# Patient Record
Sex: MALE | Race: BLACK OR AFRICAN AMERICAN | NOT HISPANIC OR LATINO | Employment: FULL TIME | URBAN - METROPOLITAN AREA
[De-identification: names, ages, dates, MRNs, and addresses within clinical notes are randomized per-mention and may not be internally consistent; named-entity substitution may affect disease eponyms.]

---

## 2017-02-09 ENCOUNTER — OFFICE VISIT - HEALTHEAST (OUTPATIENT)
Dept: FAMILY MEDICINE | Facility: CLINIC | Age: 37
End: 2017-02-09

## 2017-02-09 DIAGNOSIS — I10 ACCELERATED HYPERTENSION: ICD-10-CM

## 2017-02-14 ENCOUNTER — RECORDS - HEALTHEAST (OUTPATIENT)
Dept: ADMINISTRATIVE | Facility: OTHER | Age: 37
End: 2017-02-14

## 2017-02-14 ENCOUNTER — OFFICE VISIT - HEALTHEAST (OUTPATIENT)
Dept: FAMILY MEDICINE | Facility: CLINIC | Age: 37
End: 2017-02-14

## 2017-02-14 DIAGNOSIS — N17.9 ACUTE RENAL FAILURE, UNSPECIFIED ACUTE RENAL FAILURE TYPE (H): ICD-10-CM

## 2017-02-14 DIAGNOSIS — L73.9 CHRONIC FOLLICULITIS: ICD-10-CM

## 2017-02-14 DIAGNOSIS — I10 HYPERTENSION: ICD-10-CM

## 2017-02-14 DIAGNOSIS — H35.032 HYPERTENSIVE RETINOPATHY OF LEFT EYE: ICD-10-CM

## 2017-02-14 LAB — HBA1C MFR BLD: 5 % (ref 3.5–6)

## 2017-02-14 ASSESSMENT — MIFFLIN-ST. JEOR: SCORE: 2178.13

## 2017-02-17 ENCOUNTER — AMBULATORY - HEALTHEAST (OUTPATIENT)
Dept: NURSING | Facility: CLINIC | Age: 37
End: 2017-02-17

## 2017-02-17 DIAGNOSIS — I16.1 HYPERTENSIVE EMERGENCY: ICD-10-CM

## 2017-03-13 ENCOUNTER — RECORDS - HEALTHEAST (OUTPATIENT)
Dept: ADMINISTRATIVE | Facility: OTHER | Age: 37
End: 2017-03-13

## 2017-03-14 ENCOUNTER — OFFICE VISIT - HEALTHEAST (OUTPATIENT)
Dept: FAMILY MEDICINE | Facility: CLINIC | Age: 37
End: 2017-03-14

## 2017-03-14 DIAGNOSIS — N17.9 ACUTE RENAL FAILURE, UNSPECIFIED ACUTE RENAL FAILURE TYPE (H): ICD-10-CM

## 2017-03-14 DIAGNOSIS — R06.81 APNEA: ICD-10-CM

## 2017-03-14 DIAGNOSIS — I10 HYPERTENSION: ICD-10-CM

## 2017-04-03 ENCOUNTER — COMMUNICATION - HEALTHEAST (OUTPATIENT)
Dept: FAMILY MEDICINE | Facility: CLINIC | Age: 37
End: 2017-04-03

## 2017-04-04 ENCOUNTER — COMMUNICATION - HEALTHEAST (OUTPATIENT)
Dept: INTENSIVE CARE | Facility: CLINIC | Age: 37
End: 2017-04-04

## 2017-04-04 DIAGNOSIS — I16.1 HYPERTENSIVE EMERGENCY: ICD-10-CM

## 2017-04-10 ENCOUNTER — COMMUNICATION - HEALTHEAST (OUTPATIENT)
Dept: FAMILY MEDICINE | Facility: CLINIC | Age: 37
End: 2017-04-10

## 2017-04-10 DIAGNOSIS — I16.1 HYPERTENSIVE EMERGENCY: ICD-10-CM

## 2017-06-12 ENCOUNTER — COMMUNICATION - HEALTHEAST (OUTPATIENT)
Dept: INTENSIVE CARE | Facility: CLINIC | Age: 37
End: 2017-06-12

## 2017-06-12 DIAGNOSIS — I16.1 HYPERTENSIVE EMERGENCY: ICD-10-CM

## 2017-07-20 ENCOUNTER — COMMUNICATION - HEALTHEAST (OUTPATIENT)
Dept: FAMILY MEDICINE | Facility: CLINIC | Age: 37
End: 2017-07-20

## 2017-07-26 ENCOUNTER — COMMUNICATION - HEALTHEAST (OUTPATIENT)
Dept: FAMILY MEDICINE | Facility: CLINIC | Age: 37
End: 2017-07-26

## 2017-09-11 ENCOUNTER — COMMUNICATION - HEALTHEAST (OUTPATIENT)
Dept: FAMILY MEDICINE | Facility: CLINIC | Age: 37
End: 2017-09-11

## 2017-10-18 ENCOUNTER — COMMUNICATION - HEALTHEAST (OUTPATIENT)
Dept: FAMILY MEDICINE | Facility: CLINIC | Age: 37
End: 2017-10-18

## 2017-11-15 ENCOUNTER — COMMUNICATION - HEALTHEAST (OUTPATIENT)
Dept: FAMILY MEDICINE | Facility: CLINIC | Age: 37
End: 2017-11-15

## 2017-11-17 ENCOUNTER — OFFICE VISIT - HEALTHEAST (OUTPATIENT)
Dept: FAMILY MEDICINE | Facility: CLINIC | Age: 37
End: 2017-11-17

## 2017-11-17 DIAGNOSIS — I10 HYPERTENSION: ICD-10-CM

## 2017-11-17 DIAGNOSIS — N18.9 CHRONIC RENAL DISEASE: ICD-10-CM

## 2017-11-17 DIAGNOSIS — I16.1 HYPERTENSIVE EMERGENCY: ICD-10-CM

## 2017-11-17 DIAGNOSIS — Z00.00 ROUTINE GENERAL MEDICAL EXAMINATION AT A HEALTH CARE FACILITY: ICD-10-CM

## 2017-11-17 DIAGNOSIS — K04.7 DENTAL ABSCESS: ICD-10-CM

## 2017-11-17 LAB
CHOLEST SERPL-MCNC: 189 MG/DL
FASTING STATUS PATIENT QL REPORTED: YES
HDLC SERPL-MCNC: 28 MG/DL
LDLC SERPL CALC-MCNC: 133 MG/DL
TRIGL SERPL-MCNC: 139 MG/DL

## 2017-11-17 ASSESSMENT — MIFFLIN-ST. JEOR: SCORE: 2187.2

## 2017-11-22 ENCOUNTER — COMMUNICATION - HEALTHEAST (OUTPATIENT)
Dept: FAMILY MEDICINE | Facility: CLINIC | Age: 37
End: 2017-11-22

## 2018-01-05 ENCOUNTER — COMMUNICATION - HEALTHEAST (OUTPATIENT)
Dept: ADMINISTRATIVE | Facility: CLINIC | Age: 38
End: 2018-01-05

## 2018-01-06 ENCOUNTER — COMMUNICATION - HEALTHEAST (OUTPATIENT)
Dept: FAMILY MEDICINE | Facility: CLINIC | Age: 38
End: 2018-01-06

## 2018-01-16 ENCOUNTER — COMMUNICATION - HEALTHEAST (OUTPATIENT)
Dept: FAMILY MEDICINE | Facility: CLINIC | Age: 38
End: 2018-01-16

## 2018-02-23 ENCOUNTER — COMMUNICATION - HEALTHEAST (OUTPATIENT)
Dept: FAMILY MEDICINE | Facility: CLINIC | Age: 38
End: 2018-02-23

## 2018-03-07 ENCOUNTER — COMMUNICATION - HEALTHEAST (OUTPATIENT)
Dept: FAMILY MEDICINE | Facility: CLINIC | Age: 38
End: 2018-03-07

## 2018-04-13 ENCOUNTER — COMMUNICATION - HEALTHEAST (OUTPATIENT)
Dept: FAMILY MEDICINE | Facility: CLINIC | Age: 38
End: 2018-04-13

## 2018-04-13 DIAGNOSIS — I16.1 HYPERTENSIVE EMERGENCY: ICD-10-CM

## 2018-04-19 ENCOUNTER — COMMUNICATION - HEALTHEAST (OUTPATIENT)
Dept: FAMILY MEDICINE | Facility: CLINIC | Age: 38
End: 2018-04-19

## 2018-05-31 ENCOUNTER — COMMUNICATION - HEALTHEAST (OUTPATIENT)
Dept: FAMILY MEDICINE | Facility: CLINIC | Age: 38
End: 2018-05-31

## 2018-05-31 DIAGNOSIS — I16.1 HYPERTENSIVE EMERGENCY: ICD-10-CM

## 2018-06-01 ENCOUNTER — AMBULATORY - HEALTHEAST (OUTPATIENT)
Dept: NURSING | Facility: CLINIC | Age: 38
End: 2018-06-01

## 2018-09-14 ENCOUNTER — COMMUNICATION - HEALTHEAST (OUTPATIENT)
Dept: FAMILY MEDICINE | Facility: CLINIC | Age: 38
End: 2018-09-14

## 2018-10-08 ENCOUNTER — COMMUNICATION - HEALTHEAST (OUTPATIENT)
Dept: FAMILY MEDICINE | Facility: CLINIC | Age: 38
End: 2018-10-08

## 2018-10-26 ENCOUNTER — OFFICE VISIT - HEALTHEAST (OUTPATIENT)
Dept: FAMILY MEDICINE | Facility: CLINIC | Age: 38
End: 2018-10-26

## 2018-10-26 DIAGNOSIS — L30.9 DERMATITIS: ICD-10-CM

## 2018-10-26 DIAGNOSIS — E66.01 MORBID OBESITY (H): ICD-10-CM

## 2018-10-26 DIAGNOSIS — L03.221 CELLULITIS OF NECK: ICD-10-CM

## 2018-10-26 DIAGNOSIS — L73.9 CHRONIC FOLLICULITIS: ICD-10-CM

## 2018-10-26 DIAGNOSIS — H61.23 EXCESSIVE EAR WAX, BILATERAL: ICD-10-CM

## 2018-10-26 DIAGNOSIS — I10 HYPERTENSION: ICD-10-CM

## 2018-10-26 ASSESSMENT — MIFFLIN-ST. JEOR: SCORE: 2288.13

## 2018-12-10 ENCOUNTER — COMMUNICATION - HEALTHEAST (OUTPATIENT)
Dept: FAMILY MEDICINE | Facility: CLINIC | Age: 38
End: 2018-12-10

## 2018-12-10 DIAGNOSIS — I16.1 HYPERTENSIVE EMERGENCY: ICD-10-CM

## 2019-05-10 ENCOUNTER — COMMUNICATION - HEALTHEAST (OUTPATIENT)
Dept: SCHEDULING | Facility: CLINIC | Age: 39
End: 2019-05-10

## 2019-05-14 ENCOUNTER — OFFICE VISIT - HEALTHEAST (OUTPATIENT)
Dept: FAMILY MEDICINE | Facility: CLINIC | Age: 39
End: 2019-05-14

## 2019-05-14 DIAGNOSIS — I10 ESSENTIAL HYPERTENSION: ICD-10-CM

## 2019-05-14 DIAGNOSIS — I16.1 HYPERTENSIVE EMERGENCY: ICD-10-CM

## 2019-05-14 DIAGNOSIS — L73.9 CHRONIC FOLLICULITIS: ICD-10-CM

## 2019-05-14 DIAGNOSIS — R63.5 WEIGHT GAIN: ICD-10-CM

## 2019-05-14 DIAGNOSIS — E66.01 MORBID OBESITY (H): ICD-10-CM

## 2019-05-14 LAB
ANION GAP SERPL CALCULATED.3IONS-SCNC: 13 MMOL/L (ref 5–18)
BUN SERPL-MCNC: 21 MG/DL (ref 8–22)
CALCIUM SERPL-MCNC: 9.6 MG/DL (ref 8.5–10.5)
CHLORIDE BLD-SCNC: 102 MMOL/L (ref 98–107)
CHOLEST SERPL-MCNC: 191 MG/DL
CO2 SERPL-SCNC: 23 MMOL/L (ref 22–31)
CREAT SERPL-MCNC: 1.64 MG/DL (ref 0.7–1.3)
FASTING STATUS PATIENT QL REPORTED: NO
GFR SERPL CREATININE-BSD FRML MDRD: 47 ML/MIN/1.73M2
GLUCOSE BLD-MCNC: 104 MG/DL (ref 70–125)
HDLC SERPL-MCNC: 26 MG/DL
LDLC SERPL CALC-MCNC: 94 MG/DL
POTASSIUM BLD-SCNC: 3.7 MMOL/L (ref 3.5–5)
SODIUM SERPL-SCNC: 138 MMOL/L (ref 136–145)
TRIGL SERPL-MCNC: 356 MG/DL

## 2019-05-14 ASSESSMENT — MIFFLIN-ST. JEOR: SCORE: 2336.89

## 2019-06-14 ENCOUNTER — COMMUNICATION - HEALTHEAST (OUTPATIENT)
Dept: FAMILY MEDICINE | Facility: CLINIC | Age: 39
End: 2019-06-14

## 2019-07-02 ENCOUNTER — COMMUNICATION - HEALTHEAST (OUTPATIENT)
Dept: FAMILY MEDICINE | Facility: CLINIC | Age: 39
End: 2019-07-02

## 2019-10-01 ENCOUNTER — OFFICE VISIT - HEALTHEAST (OUTPATIENT)
Dept: FAMILY MEDICINE | Facility: CLINIC | Age: 39
End: 2019-10-01

## 2019-10-01 ENCOUNTER — COMMUNICATION - HEALTHEAST (OUTPATIENT)
Dept: FAMILY MEDICINE | Facility: CLINIC | Age: 39
End: 2019-10-01

## 2019-10-01 DIAGNOSIS — I10 ESSENTIAL HYPERTENSION: ICD-10-CM

## 2019-10-01 DIAGNOSIS — R80.9 PROTEINURIA, UNSPECIFIED TYPE: ICD-10-CM

## 2019-10-01 DIAGNOSIS — M79.89 PAIN AND SWELLING OF LOWER LEG, RIGHT: ICD-10-CM

## 2019-10-01 DIAGNOSIS — R79.89 ELEVATED SERUM CREATININE: ICD-10-CM

## 2019-10-01 DIAGNOSIS — M79.661 PAIN AND SWELLING OF LOWER LEG, RIGHT: ICD-10-CM

## 2019-10-01 LAB
ALBUMIN UR-MCNC: ABNORMAL MG/DL
ANION GAP SERPL CALCULATED.3IONS-SCNC: 8 MMOL/L (ref 5–18)
APPEARANCE UR: CLEAR
BACTERIA #/AREA URNS HPF: ABNORMAL HPF
BILIRUB UR QL STRIP: NEGATIVE
BUN SERPL-MCNC: 19 MG/DL (ref 8–22)
CALCIUM SERPL-MCNC: 10.7 MG/DL (ref 8.5–10.5)
CHLORIDE BLD-SCNC: 103 MMOL/L (ref 98–107)
CO2 SERPL-SCNC: 27 MMOL/L (ref 22–31)
COLOR UR AUTO: YELLOW
CREAT SERPL-MCNC: 1.65 MG/DL (ref 0.7–1.3)
D DIMER PPP FEU-MCNC: 1.48 FEU UG/ML
ERYTHROCYTE [DISTWIDTH] IN BLOOD BY AUTOMATED COUNT: 11.2 % (ref 11–14.5)
GFR SERPL CREATININE-BSD FRML MDRD: 46 ML/MIN/1.73M2
GLUCOSE BLD-MCNC: 108 MG/DL (ref 70–125)
GLUCOSE UR STRIP-MCNC: NEGATIVE MG/DL
HCT VFR BLD AUTO: 47.8 % (ref 40–54)
HGB BLD-MCNC: 15.3 G/DL (ref 14–18)
HGB UR QL STRIP: NEGATIVE
KETONES UR STRIP-MCNC: NEGATIVE MG/DL
LEUKOCYTE ESTERASE UR QL STRIP: NEGATIVE
MCH RBC QN AUTO: 28.6 PG (ref 27–34)
MCHC RBC AUTO-ENTMCNC: 31.9 G/DL (ref 32–36)
MCV RBC AUTO: 90 FL (ref 80–100)
NITRATE UR QL: NEGATIVE
PH UR STRIP: 5.5 [PH] (ref 5–8)
PLATELET # BLD AUTO: 315 THOU/UL (ref 140–440)
PMV BLD AUTO: 7.2 FL (ref 7–10)
POTASSIUM BLD-SCNC: 4.7 MMOL/L (ref 3.5–5)
RBC # BLD AUTO: 5.33 MILL/UL (ref 4.4–6.2)
RBC #/AREA URNS AUTO: ABNORMAL HPF
SODIUM SERPL-SCNC: 138 MMOL/L (ref 136–145)
SP GR UR STRIP: 1.02 (ref 1–1.03)
SQUAMOUS #/AREA URNS AUTO: ABNORMAL LPF
UROBILINOGEN UR STRIP-ACNC: ABNORMAL
WBC #/AREA URNS AUTO: ABNORMAL HPF
WBC: 6.1 THOU/UL (ref 4–11)

## 2019-10-01 ASSESSMENT — MIFFLIN-ST. JEOR: SCORE: 2312.79

## 2019-10-02 ENCOUNTER — AMBULATORY - HEALTHEAST (OUTPATIENT)
Dept: FAMILY MEDICINE | Facility: CLINIC | Age: 39
End: 2019-10-02

## 2019-10-02 ENCOUNTER — HOSPITAL ENCOUNTER (OUTPATIENT)
Dept: ULTRASOUND IMAGING | Facility: HOSPITAL | Age: 39
Discharge: HOME | End: 2019-10-02
Attending: FAMILY MEDICINE

## 2019-10-02 DIAGNOSIS — M79.661 PAIN AND SWELLING OF LOWER LEG, RIGHT: ICD-10-CM

## 2019-10-02 DIAGNOSIS — M79.89 PAIN AND SWELLING OF LOWER LEG, RIGHT: ICD-10-CM

## 2019-10-18 ENCOUNTER — COMMUNICATION - HEALTHEAST (OUTPATIENT)
Dept: FAMILY MEDICINE | Facility: CLINIC | Age: 39
End: 2019-10-18

## 2019-10-18 DIAGNOSIS — L73.9 CHRONIC FOLLICULITIS: ICD-10-CM

## 2019-10-19 ENCOUNTER — COMMUNICATION - HEALTHEAST (OUTPATIENT)
Dept: FAMILY MEDICINE | Facility: CLINIC | Age: 39
End: 2019-10-19

## 2019-10-19 DIAGNOSIS — I10 HYPERTENSION: ICD-10-CM

## 2019-12-29 ENCOUNTER — COMMUNICATION - HEALTHEAST (OUTPATIENT)
Dept: FAMILY MEDICINE | Facility: CLINIC | Age: 39
End: 2019-12-29

## 2019-12-29 DIAGNOSIS — L73.9 CHRONIC FOLLICULITIS: ICD-10-CM

## 2020-01-17 ENCOUNTER — COMMUNICATION - HEALTHEAST (OUTPATIENT)
Dept: FAMILY MEDICINE | Facility: CLINIC | Age: 40
End: 2020-01-17

## 2020-01-17 DIAGNOSIS — I16.1 HYPERTENSIVE EMERGENCY: ICD-10-CM

## 2020-03-09 ENCOUNTER — COMMUNICATION - HEALTHEAST (OUTPATIENT)
Dept: FAMILY MEDICINE | Facility: CLINIC | Age: 40
End: 2020-03-09

## 2020-03-09 DIAGNOSIS — L73.9 CHRONIC FOLLICULITIS: ICD-10-CM

## 2020-05-01 ENCOUNTER — COMMUNICATION - HEALTHEAST (OUTPATIENT)
Dept: FAMILY MEDICINE | Facility: CLINIC | Age: 40
End: 2020-05-01

## 2020-05-01 DIAGNOSIS — I10 HYPERTENSION: ICD-10-CM

## 2020-05-01 DIAGNOSIS — L73.9 CHRONIC FOLLICULITIS: ICD-10-CM

## 2020-06-04 ENCOUNTER — OFFICE VISIT - HEALTHEAST (OUTPATIENT)
Dept: FAMILY MEDICINE | Facility: CLINIC | Age: 40
End: 2020-06-04

## 2020-06-04 DIAGNOSIS — S90.851A FOREIGN BODY IN RIGHT FOOT, INITIAL ENCOUNTER: ICD-10-CM

## 2020-06-04 DIAGNOSIS — I10 ESSENTIAL HYPERTENSION: ICD-10-CM

## 2020-06-04 DIAGNOSIS — L73.9 CHRONIC FOLLICULITIS: ICD-10-CM

## 2020-06-04 DIAGNOSIS — E66.01 MORBID OBESITY (H): ICD-10-CM

## 2020-06-10 ENCOUNTER — OFFICE VISIT - HEALTHEAST (OUTPATIENT)
Dept: FAMILY MEDICINE | Facility: CLINIC | Age: 40
End: 2020-06-10

## 2020-06-10 DIAGNOSIS — R73.09 ELEVATED GLUCOSE: ICD-10-CM

## 2020-06-10 DIAGNOSIS — I10 ESSENTIAL HYPERTENSION: ICD-10-CM

## 2020-06-10 DIAGNOSIS — E66.01 MORBID OBESITY (H): ICD-10-CM

## 2020-06-10 DIAGNOSIS — B35.3 TINEA PEDIS OF BOTH FEET: ICD-10-CM

## 2020-06-10 LAB
ALBUMIN SERPL-MCNC: 4.2 G/DL (ref 3.5–5)
ALBUMIN UR-MCNC: ABNORMAL MG/DL
ALP SERPL-CCNC: 57 U/L (ref 45–120)
ALT SERPL W P-5'-P-CCNC: 18 U/L (ref 0–45)
ANION GAP SERPL CALCULATED.3IONS-SCNC: 12 MMOL/L (ref 5–18)
APPEARANCE UR: CLEAR
AST SERPL W P-5'-P-CCNC: 17 U/L (ref 0–40)
BACTERIA #/AREA URNS HPF: ABNORMAL HPF
BILIRUB SERPL-MCNC: 0.6 MG/DL (ref 0–1)
BILIRUB UR QL STRIP: NEGATIVE
BUN SERPL-MCNC: 13 MG/DL (ref 8–22)
CALCIUM SERPL-MCNC: 9.6 MG/DL (ref 8.5–10.5)
CHLORIDE BLD-SCNC: 103 MMOL/L (ref 98–107)
CHOLEST SERPL-MCNC: 193 MG/DL
CO2 SERPL-SCNC: 24 MMOL/L (ref 22–31)
COLOR UR AUTO: YELLOW
CREAT SERPL-MCNC: 1.6 MG/DL (ref 0.7–1.3)
ERYTHROCYTE [DISTWIDTH] IN BLOOD BY AUTOMATED COUNT: 11.1 % (ref 11–14.5)
FASTING STATUS PATIENT QL REPORTED: NO
GFR SERPL CREATININE-BSD FRML MDRD: 48 ML/MIN/1.73M2
GLUCOSE BLD-MCNC: 100 MG/DL (ref 70–125)
GLUCOSE UR STRIP-MCNC: NEGATIVE MG/DL
GRAN CASTS #/AREA URNS LPF: ABNORMAL LPF
HBA1C MFR BLD: 5.3 % (ref 3.5–6)
HCT VFR BLD AUTO: 45.5 % (ref 40–54)
HDLC SERPL-MCNC: 26 MG/DL
HGB BLD-MCNC: 15.1 G/DL (ref 14–18)
HGB UR QL STRIP: NEGATIVE
KETONES UR STRIP-MCNC: NEGATIVE MG/DL
LDLC SERPL CALC-MCNC: 132 MG/DL
LEUKOCYTE ESTERASE UR QL STRIP: NEGATIVE
MCH RBC QN AUTO: 29.3 PG (ref 27–34)
MCHC RBC AUTO-ENTMCNC: 33.2 G/DL (ref 32–36)
MCV RBC AUTO: 88 FL (ref 80–100)
MUCOUS THREADS #/AREA URNS LPF: ABNORMAL LPF
NITRATE UR QL: NEGATIVE
PH UR STRIP: 5.5 [PH] (ref 5–8)
PLATELET # BLD AUTO: 328 THOU/UL (ref 140–440)
PMV BLD AUTO: 7.2 FL (ref 7–10)
POTASSIUM BLD-SCNC: 4.1 MMOL/L (ref 3.5–5)
PROT SERPL-MCNC: 8.3 G/DL (ref 6–8)
RBC # BLD AUTO: 5.16 MILL/UL (ref 4.4–6.2)
RBC #/AREA URNS AUTO: ABNORMAL HPF
SODIUM SERPL-SCNC: 139 MMOL/L (ref 136–145)
SP GR UR STRIP: 1.02 (ref 1–1.03)
SQUAMOUS #/AREA URNS AUTO: ABNORMAL LPF
TRIGL SERPL-MCNC: 173 MG/DL
TSH SERPL DL<=0.005 MIU/L-ACNC: 1.71 UIU/ML (ref 0.3–5)
UROBILINOGEN UR STRIP-ACNC: ABNORMAL
WBC #/AREA URNS AUTO: ABNORMAL HPF
WBC: 5.7 THOU/UL (ref 4–11)

## 2020-06-10 ASSESSMENT — MIFFLIN-ST. JEOR: SCORE: 2295.61

## 2020-06-12 ENCOUNTER — COMMUNICATION - HEALTHEAST (OUTPATIENT)
Dept: FAMILY MEDICINE | Facility: CLINIC | Age: 40
End: 2020-06-12

## 2020-07-01 ENCOUNTER — OFFICE VISIT - HEALTHEAST (OUTPATIENT)
Dept: FAMILY MEDICINE | Facility: CLINIC | Age: 40
End: 2020-07-01

## 2020-07-01 DIAGNOSIS — H61.21 IMPACTED CERUMEN OF RIGHT EAR: ICD-10-CM

## 2020-07-01 DIAGNOSIS — B07.0 PLANTAR WART: ICD-10-CM

## 2020-07-01 DIAGNOSIS — I10 ESSENTIAL HYPERTENSION: ICD-10-CM

## 2020-07-01 DIAGNOSIS — E66.01 MORBID OBESITY (H): ICD-10-CM

## 2020-07-01 DIAGNOSIS — R06.83 PRIMARY SNORING: ICD-10-CM

## 2020-07-01 DIAGNOSIS — L73.9 CHRONIC FOLLICULITIS: ICD-10-CM

## 2020-07-01 ASSESSMENT — MIFFLIN-ST. JEOR: SCORE: 2278.2

## 2020-07-23 ENCOUNTER — COMMUNICATION - HEALTHEAST (OUTPATIENT)
Dept: FAMILY MEDICINE | Facility: CLINIC | Age: 40
End: 2020-07-23

## 2020-07-23 DIAGNOSIS — L73.9 CHRONIC FOLLICULITIS: ICD-10-CM

## 2020-09-14 ENCOUNTER — COMMUNICATION - HEALTHEAST (OUTPATIENT)
Dept: FAMILY MEDICINE | Facility: CLINIC | Age: 40
End: 2020-09-14

## 2020-09-14 DIAGNOSIS — L73.9 CHRONIC FOLLICULITIS: ICD-10-CM

## 2020-11-05 ENCOUNTER — SURGERY - HEALTHEAST (OUTPATIENT)
Dept: SURGERY | Facility: HOSPITAL | Age: 40
End: 2020-11-05

## 2020-11-05 ENCOUNTER — OFFICE VISIT - HEALTHEAST (OUTPATIENT)
Dept: FAMILY MEDICINE | Facility: CLINIC | Age: 40
End: 2020-11-05

## 2020-11-05 ENCOUNTER — AMBULATORY - HEALTHEAST (OUTPATIENT)
Dept: SURGERY | Facility: CLINIC | Age: 40
End: 2020-11-05

## 2020-11-05 ENCOUNTER — COMMUNICATION - HEALTHEAST (OUTPATIENT)
Dept: FAMILY MEDICINE | Facility: CLINIC | Age: 40
End: 2020-11-05

## 2020-11-05 ENCOUNTER — ANESTHESIA - HEALTHEAST (OUTPATIENT)
Dept: SURGERY | Facility: HOSPITAL | Age: 40
End: 2020-11-05

## 2020-11-05 DIAGNOSIS — I10 HYPERTENSION: ICD-10-CM

## 2020-11-05 DIAGNOSIS — R10.84 ABDOMINAL PAIN, GENERALIZED: ICD-10-CM

## 2020-11-05 DIAGNOSIS — I10 ESSENTIAL HYPERTENSION: ICD-10-CM

## 2020-11-05 DIAGNOSIS — R11.2 NAUSEA AND VOMITING, INTRACTABILITY OF VOMITING NOT SPECIFIED, UNSPECIFIED VOMITING TYPE: ICD-10-CM

## 2020-11-05 ASSESSMENT — MIFFLIN-ST. JEOR
SCORE: 2276.22
SCORE: 2260.23
SCORE: 2259.78

## 2020-11-06 ENCOUNTER — COMMUNICATION - HEALTHEAST (OUTPATIENT)
Dept: SCHEDULING | Facility: CLINIC | Age: 40
End: 2020-11-06

## 2020-11-08 RX ORDER — METOPROLOL TARTRATE 25 MG/1
TABLET, FILM COATED ORAL
Qty: 180 TABLET | Refills: 3 | Status: SHIPPED | OUTPATIENT
Start: 2020-11-08 | End: 2021-12-14

## 2020-11-10 ENCOUNTER — OFFICE VISIT - HEALTHEAST (OUTPATIENT)
Dept: FAMILY MEDICINE | Facility: CLINIC | Age: 40
End: 2020-11-10

## 2020-11-10 DIAGNOSIS — K35.30 ACUTE APPENDICITIS WITH LOCALIZED PERITONITIS, WITHOUT PERFORATION, ABSCESS, OR GANGRENE: ICD-10-CM

## 2020-11-10 DIAGNOSIS — Z09 HOSPITAL DISCHARGE FOLLOW-UP: ICD-10-CM

## 2020-11-10 DIAGNOSIS — D72.9 ABNORMAL WHITE BLOOD CELL (WBC) COUNT: ICD-10-CM

## 2020-11-10 DIAGNOSIS — N17.9 AKI (ACUTE KIDNEY INJURY) (H): ICD-10-CM

## 2020-11-10 DIAGNOSIS — I10 ESSENTIAL HYPERTENSION: ICD-10-CM

## 2020-11-10 LAB
ALBUMIN SERPL-MCNC: 3.9 G/DL (ref 3.5–5)
ANION GAP SERPL CALCULATED.3IONS-SCNC: 10 MMOL/L (ref 5–18)
BUN SERPL-MCNC: 20 MG/DL (ref 8–22)
CALCIUM SERPL-MCNC: 9.2 MG/DL (ref 8.5–10.5)
CHLORIDE BLD-SCNC: 105 MMOL/L (ref 98–107)
CO2 SERPL-SCNC: 22 MMOL/L (ref 22–31)
CREAT SERPL-MCNC: 1.4 MG/DL (ref 0.7–1.3)
ERYTHROCYTE [DISTWIDTH] IN BLOOD BY AUTOMATED COUNT: 11.4 % (ref 11–14.5)
GFR SERPL CREATININE-BSD FRML MDRD: 56 ML/MIN/1.73M2
GLUCOSE BLD-MCNC: 97 MG/DL (ref 70–125)
HCT VFR BLD AUTO: 45.7 % (ref 40–54)
HGB BLD-MCNC: 14.8 G/DL (ref 14–18)
MCH RBC QN AUTO: 28.7 PG (ref 27–34)
MCHC RBC AUTO-ENTMCNC: 32.4 G/DL (ref 32–36)
MCV RBC AUTO: 89 FL (ref 80–100)
PLATELET # BLD AUTO: 315 THOU/UL (ref 140–440)
PMV BLD AUTO: 7.4 FL (ref 7–10)
POTASSIUM BLD-SCNC: 4.6 MMOL/L (ref 3.5–5)
RBC # BLD AUTO: 5.16 MILL/UL (ref 4.4–6.2)
SODIUM SERPL-SCNC: 137 MMOL/L (ref 136–145)
WBC: 9.8 THOU/UL (ref 4–11)

## 2020-11-10 ASSESSMENT — MIFFLIN-ST. JEOR: SCORE: 2251.84

## 2020-11-11 ENCOUNTER — COMMUNICATION - HEALTHEAST (OUTPATIENT)
Dept: FAMILY MEDICINE | Facility: CLINIC | Age: 40
End: 2020-11-11

## 2020-11-20 ENCOUNTER — COMMUNICATION - HEALTHEAST (OUTPATIENT)
Dept: FAMILY MEDICINE | Facility: CLINIC | Age: 40
End: 2020-11-20

## 2020-11-20 DIAGNOSIS — L73.9 CHRONIC FOLLICULITIS: ICD-10-CM

## 2020-12-04 ENCOUNTER — COMMUNICATION - HEALTHEAST (OUTPATIENT)
Dept: FAMILY MEDICINE | Facility: CLINIC | Age: 40
End: 2020-12-04

## 2020-12-04 DIAGNOSIS — I16.1 HYPERTENSIVE EMERGENCY: ICD-10-CM

## 2020-12-07 RX ORDER — AMLODIPINE BESYLATE 10 MG/1
TABLET ORAL
Qty: 90 TABLET | Refills: 2 | Status: SHIPPED | OUTPATIENT
Start: 2020-12-07 | End: 2021-12-09

## 2020-12-10 ENCOUNTER — OFFICE VISIT - HEALTHEAST (OUTPATIENT)
Dept: FAMILY MEDICINE | Facility: CLINIC | Age: 40
End: 2020-12-10

## 2020-12-10 DIAGNOSIS — E66.01 MORBID OBESITY (H): ICD-10-CM

## 2020-12-10 DIAGNOSIS — K35.30 ACUTE APPENDICITIS WITH LOCALIZED PERITONITIS, WITHOUT PERFORATION, ABSCESS, OR GANGRENE: ICD-10-CM

## 2020-12-10 DIAGNOSIS — L73.9 CHRONIC FOLLICULITIS: ICD-10-CM

## 2020-12-10 DIAGNOSIS — I10 ESSENTIAL HYPERTENSION: ICD-10-CM

## 2020-12-10 ASSESSMENT — MIFFLIN-ST. JEOR: SCORE: 2295.78

## 2021-01-04 ENCOUNTER — COMMUNICATION - HEALTHEAST (OUTPATIENT)
Dept: FAMILY MEDICINE | Facility: CLINIC | Age: 41
End: 2021-01-04

## 2021-01-04 DIAGNOSIS — I16.1 HYPERTENSIVE EMERGENCY: ICD-10-CM

## 2021-01-04 DIAGNOSIS — L73.9 CHRONIC FOLLICULITIS: ICD-10-CM

## 2021-02-04 ENCOUNTER — OFFICE VISIT - HEALTHEAST (OUTPATIENT)
Dept: FAMILY MEDICINE | Facility: CLINIC | Age: 41
End: 2021-02-04

## 2021-02-04 DIAGNOSIS — E66.01 MORBID OBESITY (H): ICD-10-CM

## 2021-02-04 DIAGNOSIS — I10 ESSENTIAL HYPERTENSION: ICD-10-CM

## 2021-02-04 DIAGNOSIS — L73.9 CHRONIC FOLLICULITIS: ICD-10-CM

## 2021-02-04 LAB
ANION GAP SERPL CALCULATED.3IONS-SCNC: 14 MMOL/L (ref 5–18)
BUN SERPL-MCNC: 22 MG/DL (ref 8–22)
CALCIUM SERPL-MCNC: 9.3 MG/DL (ref 8.5–10.5)
CHLORIDE BLD-SCNC: 103 MMOL/L (ref 98–107)
CO2 SERPL-SCNC: 24 MMOL/L (ref 22–31)
CREAT SERPL-MCNC: 1.65 MG/DL (ref 0.7–1.3)
GFR SERPL CREATININE-BSD FRML MDRD: 46 ML/MIN/1.73M2
GLUCOSE BLD-MCNC: 101 MG/DL (ref 70–125)
POTASSIUM BLD-SCNC: 3.9 MMOL/L (ref 3.5–5)
SODIUM SERPL-SCNC: 141 MMOL/L (ref 136–145)

## 2021-02-04 RX ORDER — CLOBETASOL PROPIONATE 0.5 MG/G
OINTMENT TOPICAL
Qty: 30 G | Refills: 4 | Status: SHIPPED | OUTPATIENT
Start: 2021-02-04 | End: 2022-06-16

## 2021-02-04 ASSESSMENT — MIFFLIN-ST. JEOR: SCORE: 2246.17

## 2021-03-04 ENCOUNTER — COMMUNICATION - HEALTHEAST (OUTPATIENT)
Dept: FAMILY MEDICINE | Facility: CLINIC | Age: 41
End: 2021-03-04

## 2021-03-04 DIAGNOSIS — L73.9 CHRONIC FOLLICULITIS: ICD-10-CM

## 2021-04-19 ENCOUNTER — COMMUNICATION - HEALTHEAST (OUTPATIENT)
Dept: FAMILY MEDICINE | Facility: CLINIC | Age: 41
End: 2021-04-19

## 2021-05-04 ENCOUNTER — COMMUNICATION - HEALTHEAST (OUTPATIENT)
Dept: FAMILY MEDICINE | Facility: CLINIC | Age: 41
End: 2021-05-04

## 2021-05-07 ENCOUNTER — COMMUNICATION - HEALTHEAST (OUTPATIENT)
Dept: FAMILY MEDICINE | Facility: CLINIC | Age: 41
End: 2021-05-07

## 2021-05-07 DIAGNOSIS — L73.9 CHRONIC FOLLICULITIS: ICD-10-CM

## 2021-05-28 NOTE — TELEPHONE ENCOUNTER
"Pt reports both legs are swollen \"for a couple of months\". See assessment below.    Advised pt to be seen in clinic within three days per protocol.     Pt verbalizes understanding and agrees to plan.        Reason for Disposition    [1] MILD swelling of both ankles (i.e., pedal edema) AND [2] new onset or worsening    Answer Assessment - Initial Assessment Questions  1. ONSET: \"When did the swelling start?\" (e.g., minutes, hours, days)      A couple of months ago  2. LOCATION: \"What part of the leg is swollen?\"  \"Are both legs swollen or just one leg?\"      Feet and ankles  3. SEVERITY: \"How bad is the swelling?\" (e.g., localized; mild, moderate, severe)   - Localized - small area of swelling localized to one leg   - MILD pedal edema - swelling limited to foot and ankle, pitting edema < 1/4 inch (6 mm) deep, rest and elevation eliminate most or all swelling   - MODERATE edema - swelling of lower leg to knee, pitting edema > 1/4 inch (6 mm) deep, rest and elevation only partially reduce swelling   - SEVERE edema - swelling extends above knee, facial or hand swelling present      Mild   4. REDNESS: \"Does the swelling look red or infected?\"      No  5. PAIN: \"Is the swelling painful to touch?\" If so, ask: \"How painful is it?\"   (Scale 1-10; mild, moderate or severe)      No  6. FEVER: \"Do you have a fever?\" If so, ask: \"What is it, how was it measured, and when did it start?\"       No  7. CAUSE: \"What do you think is causing the leg swelling?\"      Not sure  8. MEDICAL HISTORY: \"Do you have a history of heart failure, kidney disease, liver failure, or cancer?\"     Acute kidney failure  9. RECURRENT SYMPTOM: \"Have you had leg swelling before?\" If so, ask: \"When was the last time?\" \"What happened that time?\"      Yes  10. OTHER SYMPTOMS: \"Do you have any other symptoms?\" (e.g., chest pain, difficulty breathing)        No  11. PREGNANCY: \"Is there any chance you are pregnant?\" \"When was your last menstrual period?\"        " n/a    Protocols used: LEG SWELLING AND EDEMA-A-AH

## 2021-05-28 NOTE — PROGRESS NOTES
ASSESSMENT AND PLAN:  1. Morbid obesity (H) patient's gained 10 pounds or 6 months ago he is still not engaged in any activity does admit to eating junk food he needs to change his dietary habits and start an exercise plan. Basic Metabolic Panel    Lipid Cascade   2. Chronic folliculitis symptoms controlled with the clobetasol and the Keflex skin is not itchy at this time.    3. Weight gain he is gained weight and noticed some swelling in his ankles at the end of the day denies any chest pain.  Occasional feel shortness of breath smoke checking kidney function today. Basic Metabolic Panel   4. Essential hypertension we will consider adding an additional agent hypertensive control Basic Metabolic Panel    Lipid Cascade   5. Hypertensive emergency blood pressure is elevated refilling metoprolol refilling amlodipine amLODIPine (NORVASC) 10 MG tablet       CHIEF COMPLAINT:  Chief Complaint   Patient presents with     Foot Swelling     Bilateral feet, thinks it might be from the bp meds        HISTORY OF PRESENT ILLNESS:  Ray is a 39 y.o. male who presents to the clinic today for bilateral feet swelling.  Patient is noticed that he has had some swelling in his ankles and indentation his leg on the skin when he sits against a chair.  He also notes that he is gained weight.  He does not feel more fatigued with occasional noticed some wheezing he is now working only 40 hours/week but admits he is not doing any physical activity.  He is been taking his medications faithfully and picking them up every month.    REVIEW OF SYSTEMS:   [facility]: Bilateral pedal edema.   General has noted weight gain  All other 10 point review systems are negative.    PFSH:  Reviewed as below.   Now works 40 hours/week    TOBACCO USE:  Social History     Tobacco Use   Smoking Status Never Smoker   Smokeless Tobacco Never Used       VITALS:  There were no vitals filed for this visit.  Wt Readings from Last 3 Encounters:   10/26/18 (!) 309 lb 4 oz  (140.3 kg)   11/17/17 (!) 287 lb (130.2 kg)   03/14/17 (!) 290 lb 4 oz (131.7 kg)     There is no height or weight on file to calculate BMI.    QUALITY MEASURES:  The following high BMI interventions were performed this visit: weight monitoring, prescribed dietary intake and dietary needs education      PHYSICAL EXAM:  General: Alert, cooperative, no distress, appears stated age  Head: Normocephalic, without obvious abnormality, atraumatic  Eyes: PERRL, conjunctiva/cornea clear, EOM's intact, fundi benign, both eyes  Ears: Normal TM's and external ear canals, both ears  Nose: Nares normal, septum midline, mucosa normal, no drainage or sinus tenderness  Throat: Lips, mucosa, and tongue normal; teeth and gums normal  Back: Symmetric, no curvature, ROM normal, no CVA tenderness  Lungs: Clear to auscultation bilaterally, respirations unlabored  Chest wall: No tenderness or deformity  Heart: Regular rate and rhythm, S1 and S2 normal, no murmur, rub, or gallop  Abdomen: Soft, non tender, bowel sounds active all four quadrants, no masses, no organomegaly.  Neurologic:  A & O x 3.  No tremor, no focal findings.  Normal gait.     DATA REVIEWED:  Additional History from Old Records Summarized (2): *  Decision to Obtain Records (1): 0  Radiology Tests Summarized or Ordered (1): 0  Labs Reviewed or Ordered (1): 1  Medicine Test Summarized or Ordered (1): 0  Independent Review of EKG or X-RAY(2 each): 0    The visit lasted a total of 25 minutes face to face with the patient. Over 50% of the time was spent counseling and educating the patient about   Obesity renal function hypertension folliculitis.    I, Denise Juares, am scribing for and in the presence of, Dr. Dominguez.    IDr. Dominguez, personally performed the services described in this documentation, as scribed by Denise Juares in my presence, and it is both accurate and complete.      MEDICATIONS:  Current Outpatient Medications   Medication Sig Dispense Refill     amLODIPine  (NORVASC) 10 MG tablet Take 1 tablet (10 mg total) by mouth daily. 90 tablet 3     amLODIPine (NORVASC) 10 MG tablet TAKE 1 TABLET(10 MG) BY MOUTH DAILY 90 tablet 2     cephalexin (KEFLEX) 500 MG capsule TAKE 1 CAPSULE(500 MG) BY MOUTH TWICE DAILY FOR 21 DAYS 42 capsule 0     clobetasol (TEMOVATE) 0.05 % Gel Apply to affected area twice daily 60 each 1     metoprolol tartrate (LOPRESSOR) 25 MG tablet TAKE 1 TABLET(25 MG) BY MOUTH TWICE DAILY 180 tablet 3     metoprolol tartrate (LOPRESSOR) 25 MG tablet Take 0.5 tablets (12.5 mg total) by mouth 2 (two) times a day. 60 tablet 0     metoprolol tartrate (LOPRESSOR) 25 MG tablet TAKE 1 TABLET(25 MG) BY MOUTH TWICE DAILY 180 tablet 0     No current facility-administered medications for this visit.        Total Data Points: 1    Please note that this clinical encounter uses voice recognition software, there may be typographical errors present

## 2021-05-29 NOTE — TELEPHONE ENCOUNTER
Called and left message for patient.  If patient calls back please schedule for HTN appointment with Dr. Dominguez.

## 2021-05-30 VITALS — BODY MASS INDEX: 42.86 KG/M2 | WEIGHT: 290.25 LBS

## 2021-05-30 VITALS — WEIGHT: 285 LBS | BODY MASS INDEX: 42.21 KG/M2 | HEIGHT: 69 IN

## 2021-05-30 VITALS — WEIGHT: 290.4 LBS

## 2021-05-31 VITALS — WEIGHT: 287 LBS | HEIGHT: 69 IN | BODY MASS INDEX: 42.51 KG/M2

## 2021-06-01 NOTE — TELEPHONE ENCOUNTER
Left message to call    Please notify patient that his D dimer is elevated which can mean a DVT or kidney issues but Dr Hobbs wants him to keep his appt tomorrow morning for his Ultrasound. He can call Dr Dominguez for his other lab results tomorrow as Dr Hobbs won't be in clinic.

## 2021-06-01 NOTE — PROGRESS NOTES
"HPI  - 38 yo male (who is new to me)  here with right leg pain and swelling.   A few weeks ago, when he was leaving work, he injured his right calf. He was running and wearing boots, and stepped the wrong way and foot twisted, he then heard a popping sounds then had pain in right calf. He did not fall but had to limp to walk. This occurred 3-4 weeks ago. He has tried icy hot and topical patch and the redness and swelling have been getting worse. He has noticed the skin is darker color and swelling and hardness of his calf.   He has tried to elevate his leg and that helps with the swelling at night.   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     metoprolol tartrate (LOPRESSOR) 25 MG tablet TAKE 1 TABLET(25 MG) BY MOUTH TWICE DAILY       Vitals:    10/01/19 1010   BP: 136/88   Pulse: 69   Resp: 16   Temp: 98.6  F (37  C)   TempSrc: Oral   SpO2: 100%   Weight: (!) 316 lb 11.2 oz (143.7 kg)   Height: 5' 8.43\" (1.738 m)     PHYSICAL EXAM   General Appearance: Awake and alert, in no acute distress  HEENT: neck is supple  CV: regular rate  Resp: No respiratory distress. Breathing comfortably  Musculoskeletal: right LE swelling of foot and leg up to knee with 2+ pitting edema, redness/darkened skin color, hardness and warms and   Skin: no rashes noted or open wound on skin except white scaly skin on feet and toes c/w tinea pedis    A/P      1. Pain and swelling of lower leg, right  Unclear etiology but the swelling, darkened skin color, hardness " and warmth are concerning for possible DVT or cellulitis and thus will check DDimer, CBC, and venous u/s. Given the elevated CR with h/o proteinuria and pitting edema, will recheck kidney function as possible etiology of LE swelling. The other possible etiology given a recent injury and twisting of leg is compartment syndrome but the mechanism of action and severity of injury are not consistent with that which would typically trigger compartment syndrome.   - Urinalysis-UC if Indicated  - D-dimer, Quantitative  - HM2(CBC w/o Differential)  - US Venous Leg Right; Future  - Basic Metabolic Panel    2. Proteinuria, unspecified type  - Urinalysis-UC if Indicated    3. Elevated serum creatinine  - Basic Metabolic Panel    4. Essential hypertension  Well controlled with current regimen

## 2021-06-01 NOTE — PROGRESS NOTES
I called the patient today and discussed his health with him.  He is aware of his blood test reports.  He is also aware that the ultrasound report was negative for DVT or blood clot.  He states his pain has decreased and the swelling is decreased if his pain is still present next week I asked him to return for a follow-up he evidenced understanding

## 2021-06-01 NOTE — TELEPHONE ENCOUNTER
Patient Returning Call  Reason for call:  Returning clinic call.  Information relayed to patient:  Read to the patient the notes written by Dr Nash.  Patient understands, will keep the ultrasound appointment and check bck with Dr Dominguez.  Patient has additional questions:  No  If YES, what are your questions/concerns:  NA  Okay to leave a detailed message?: No call back needed

## 2021-06-01 NOTE — TELEPHONE ENCOUNTER
Left message for patient to call  Please help patient schedule appt for physical and BP check with Dr Dominguez.

## 2021-06-02 VITALS — WEIGHT: 315 LBS | HEIGHT: 69 IN | BODY MASS INDEX: 46.65 KG/M2

## 2021-06-02 VITALS — HEIGHT: 69 IN | BODY MASS INDEX: 45.8 KG/M2 | WEIGHT: 309.25 LBS

## 2021-06-02 NOTE — TELEPHONE ENCOUNTER
RN cannot approve Refill Request    RN can NOT refill this medication med is not covered by policy/route to provider. Last office visit: 5/14/2019 Bright Dominguez MD Last Physical: 11/17/2017 Last MTM visit: Visit date not found Last visit same specialty: 10/1/2019 Radha Hobbs MD.  Next visit within 3 mo: Visit date not found  Next physical within 3 mo: Visit date not found      Sofia Neville, [facility] Connection Triage/Med Refill 10/18/2019    Requested Prescriptions   Pending Prescriptions Disp Refills     cephalexin (KEFLEX) 500 MG capsule [Pharmacy Med Name: CEPHALEXIN 500MG CAPSULES] 40 capsule 0     Sig: TAKE 1 CAPSULE(500 MG) BY MOUTH TWICE DAILY       There is no refill protocol information for this order

## 2021-06-02 NOTE — TELEPHONE ENCOUNTER
Refill Approved    Rx renewed per Medication Renewal Policy. Medication was last renewed on 10/26/18.    Sofia Neville, [facility] Connection Triage/Med Refill 10/19/2019     Requested Prescriptions   Pending Prescriptions Disp Refills     metoprolol tartrate (LOPRESSOR) 25 MG tablet [Pharmacy Med Name: METOPROLOL TARTRATE 25MG TABLETS] 180 tablet 0     Sig: TAKE 1 TABLET BY MOUTH TWICE DAILY       Beta-Blockers Refill Protocol Passed - 10/19/2019 12:50 PM        Passed - PCP or prescribing provider visit in past 12 months or next 3 months     Last office visit with prescriber/PCP: 5/14/2019 Bright Dominguez MD OR same dept: 10/1/2019 Radha Hobbs MD OR same specialty: 10/1/2019 Radha Hobbs MD  Last physical: 11/17/2017 Last MTM visit: Visit date not found   Next visit within 3 mo: Visit date not found  Next physical within 3 mo: Visit date not found  Prescriber OR PCP: Bright Dominguez MD  Last diagnosis associated with med order: 1. Hypertension  - metoprolol tartrate (LOPRESSOR) 25 MG tablet [Pharmacy Med Name: METOPROLOL TARTRATE 25MG TABLETS]; TAKE 1 TABLET BY MOUTH TWICE DAILY  Dispense: 180 tablet; Refill: 0    If protocol passes may refill for 12 months if within 3 months of last provider visit (or a total of 15 months).             Passed - Blood pressure filed in past 12 months     BP Readings from Last 1 Encounters:   10/01/19 136/88

## 2021-06-03 VITALS
RESPIRATION RATE: 16 BRPM | OXYGEN SATURATION: 100 % | WEIGHT: 315 LBS | TEMPERATURE: 98.6 F | DIASTOLIC BLOOD PRESSURE: 88 MMHG | BODY MASS INDEX: 47.74 KG/M2 | SYSTOLIC BLOOD PRESSURE: 136 MMHG | HEIGHT: 68 IN | HEART RATE: 69 BPM

## 2021-06-04 ENCOUNTER — COMMUNICATION - HEALTHEAST (OUTPATIENT)
Dept: FAMILY MEDICINE | Facility: CLINIC | Age: 41
End: 2021-06-04

## 2021-06-04 VITALS
SYSTOLIC BLOOD PRESSURE: 144 MMHG | HEART RATE: 88 BPM | HEIGHT: 68 IN | RESPIRATION RATE: 20 BRPM | HEIGHT: 68 IN | WEIGHT: 306.6 LBS | BODY MASS INDEX: 47 KG/M2 | BODY MASS INDEX: 46.47 KG/M2 | DIASTOLIC BLOOD PRESSURE: 76 MMHG | TEMPERATURE: 99.1 F | WEIGHT: 310.13 LBS

## 2021-06-04 VITALS
DIASTOLIC BLOOD PRESSURE: 88 MMHG | RESPIRATION RATE: 18 BRPM | SYSTOLIC BLOOD PRESSURE: 130 MMHG | BODY MASS INDEX: 47.65 KG/M2 | HEART RATE: 66 BPM | WEIGHT: 314.4 LBS | HEIGHT: 68 IN | TEMPERATURE: 98.4 F | OXYGEN SATURATION: 97 %

## 2021-06-04 VITALS
HEIGHT: 68 IN | SYSTOLIC BLOOD PRESSURE: 152 MMHG | WEIGHT: 310.56 LBS | OXYGEN SATURATION: 96 % | HEART RATE: 68 BPM | RESPIRATION RATE: 20 BRPM | BODY MASS INDEX: 47.07 KG/M2 | DIASTOLIC BLOOD PRESSURE: 100 MMHG | TEMPERATURE: 98 F

## 2021-06-04 VITALS
HEART RATE: 64 BPM | HEIGHT: 68 IN | RESPIRATION RATE: 20 BRPM | WEIGHT: 304.75 LBS | BODY MASS INDEX: 46.19 KG/M2 | TEMPERATURE: 98 F | DIASTOLIC BLOOD PRESSURE: 86 MMHG | SYSTOLIC BLOOD PRESSURE: 128 MMHG | OXYGEN SATURATION: 97 %

## 2021-06-04 DIAGNOSIS — I10 ESSENTIAL HYPERTENSION: ICD-10-CM

## 2021-06-04 NOTE — TELEPHONE ENCOUNTER
RN cannot approve Refill Request    RN can NOT refill this medication med is not covered by policy/route to provider. Last office visit: 5/14/2019 Bright Dominguez MD Last Physical: 11/17/2017 Last MTM visit: Visit date not found Last visit same specialty: 10/1/2019 Radha Hobbs MD.  Next visit within 3 mo: Visit date not found  Next physical within 3 mo: Visit date not found      Whitney Darby, [facility] Triage/Med Refill 12/29/2019    Requested Prescriptions   Pending Prescriptions Disp Refills     cephalexin (KEFLEX) 500 MG capsule [Pharmacy Med Name: CEPHALEXIN 500MG CAPSULES] 40 capsule 0     Sig: TAKE 1 CAPSULE(500 MG) BY MOUTH TWICE DAILY       There is no refill protocol information for this order

## 2021-06-05 VITALS
HEIGHT: 68 IN | HEART RATE: 76 BPM | DIASTOLIC BLOOD PRESSURE: 92 MMHG | SYSTOLIC BLOOD PRESSURE: 144 MMHG | RESPIRATION RATE: 18 BRPM | WEIGHT: 303.5 LBS | BODY MASS INDEX: 46 KG/M2 | TEMPERATURE: 98 F

## 2021-06-05 VITALS
RESPIRATION RATE: 18 BRPM | TEMPERATURE: 98.2 F | DIASTOLIC BLOOD PRESSURE: 106 MMHG | SYSTOLIC BLOOD PRESSURE: 140 MMHG | BODY MASS INDEX: 47.66 KG/M2 | WEIGHT: 314.44 LBS | HEART RATE: 68 BPM | HEIGHT: 68 IN

## 2021-06-05 RX ORDER — CHLORTHALIDONE 25 MG/1
25 TABLET ORAL DAILY
Qty: 90 TABLET | Refills: 2 | Status: SHIPPED | OUTPATIENT
Start: 2021-06-05 | End: 2022-03-02

## 2021-06-05 NOTE — TELEPHONE ENCOUNTER
Refill Approved    Rx renewed per Medication Renewal Policy. Medication was last renewed on 11/17/2017 with 3 refills.  Last office visit: 10/1/2019 with Dr FRANKLYN Hobbs @ [facility]     [facility] 1/19/2020     Requested Prescriptions   Pending Prescriptions Disp Refills     amLODIPine (NORVASC) 10 MG tablet [Pharmacy Med Name: AMLODIPINE BESYLATE 10MG TABLETS] 90 tablet 3     Sig: TAKE 1 TABLET(10 MG) BY MOUTH DAILY       Calcium-Channel Blockers Protocol Passed - 1/17/2020 12:45 PM        Passed - PCP or prescribing provider visit in past 12 months or next 3 months     Last office visit with prescriber/PCP: 5/14/2019 Bright Dominguez MD OR same dept: 10/1/2019 Radha Hobbs MD OR same specialty: 10/1/2019 Radha Hobbs MD  Last physical: 11/17/2017 Last MTM visit: Visit date not found   Next visit within 3 mo: Visit date not found  Next physical within 3 mo: Visit date not found  Prescriber OR PCP: Bright Dominguez MD  Last diagnosis associated with med order: 1. Hypertensive emergency  - amLODIPine (NORVASC) 10 MG tablet [Pharmacy Med Name: AMLODIPINE BESYLATE 10MG TABLETS]; TAKE 1 TABLET(10 MG) BY MOUTH DAILY  Dispense: 90 tablet; Refill: 3    If protocol passes may refill for 12 months if within 3 months of last provider visit (or a total of 15 months).             Passed - Blood pressure filed in past 12 months     BP Readings from Last 1 Encounters:   10/01/19 136/88

## 2021-06-06 NOTE — TELEPHONE ENCOUNTER
RN cannot approve Refill Request    RN can NOT refill this medication med is not covered by policy/route to provider       . Last office visit: 5/14/2019 Bright Dominguez MD Last Physical: 11/17/2017 Last MTM visit: Visit date not found Last visit same specialty: 10/1/2019 Radha Hobbs MD.  Next visit within 3 mo: Visit date not found  Next physical within 3 mo: Visit date not found      Trini Francis, [facility] Connection Triage/Med Refill 3/12/2020    Requested Prescriptions   Pending Prescriptions Disp Refills     cephalexin (KEFLEX) 500 MG capsule [Pharmacy Med Name: CEPHALEXIN 500MG CAPSULES] 40 capsule 0     Sig: TAKE 1 CAPSULE(500 MG) BY MOUTH TWICE DAILY       There is no refill protocol information for this order

## 2021-06-07 ENCOUNTER — COMMUNICATION - HEALTHEAST (OUTPATIENT)
Dept: FAMILY MEDICINE | Facility: CLINIC | Age: 41
End: 2021-06-07

## 2021-06-07 DIAGNOSIS — L73.9 CHRONIC FOLLICULITIS: ICD-10-CM

## 2021-06-07 NOTE — TELEPHONE ENCOUNTER
Refill Approved    Rx renewed per Medication Renewal Policy. Medication was last renewed on 10/19/19.    Trini Francis, [facility] Triage/Med Refill 5/4/2020     Requested Prescriptions   Pending Prescriptions Disp Refills     cephalexin (KEFLEX) 500 MG capsule [Pharmacy Med Name: CEPHALEXIN 500MG CAPSULES] 40 capsule 0     Sig: TAKE 1 CAPSULE(500 MG) BY MOUTH TWICE DAILY       There is no refill protocol information for this order        metoprolol tartrate (LOPRESSOR) 25 MG tablet [Pharmacy Med Name: METOPROLOL TARTRATE 25MG TABLETS] 180 tablet 0     Sig: TAKE 1 TABLET BY MOUTH TWICE DAILY       Beta-Blockers Refill Protocol Passed - 5/1/2020  2:18 PM        Passed - PCP or prescribing provider visit in past 12 months or next 3 months     Last office visit with prescriber/PCP: 5/14/2019 Bright Dominguez MD OR same dept: 10/1/2019 Radha Hobbs MD OR same specialty: 10/1/2019 Radha Hobbs MD  Last physical: 11/17/2017 Last MTM visit: Visit date not found   Next visit within 3 mo: Visit date not found  Next physical within 3 mo: Visit date not found  Prescriber OR PCP: Bright Dominguez MD  Last diagnosis associated with med order: 1. Chronic folliculitis  - cephalexin (KEFLEX) 500 MG capsule [Pharmacy Med Name: CEPHALEXIN 500MG CAPSULES]; TAKE 1 CAPSULE(500 MG) BY MOUTH TWICE DAILY  Dispense: 40 capsule; Refill: 0    2. Hypertension  - metoprolol tartrate (LOPRESSOR) 25 MG tablet [Pharmacy Med Name: METOPROLOL TARTRATE 25MG TABLETS]; TAKE 1 TABLET BY MOUTH TWICE DAILY  Dispense: 180 tablet; Refill: 0    If protocol passes may refill for 12 months if within 3 months of last provider visit (or a total of 15 months).             Passed - Blood pressure filed in past 12 months     BP Readings from Last 1 Encounters:   10/01/19 136/88

## 2021-06-07 NOTE — TELEPHONE ENCOUNTER
RN cannot approve Refill Request    RN can NOT refill this medication med is not covered by policy/route to provider.     Trini Francis, [facility] Triage/Med Refill 5/4/2020    Requested Prescriptions   Pending Prescriptions Disp Refills     cephalexin (KEFLEX) 500 MG capsule [Pharmacy Med Name: CEPHALEXIN 500MG CAPSULES] 40 capsule 0     Sig: TAKE 1 CAPSULE(500 MG) BY MOUTH TWICE DAILY       There is no refill protocol information for this order      Signed Prescriptions Disp Refills    metoprolol tartrate (LOPRESSOR) 25 MG tablet 180 tablet 1     Sig: TAKE 1 TABLET BY MOUTH TWICE DAILY       Beta-Blockers Refill Protocol Passed - 5/1/2020  2:18 PM        Passed - PCP or prescribing provider visit in past 12 months or next 3 months     Last office visit with prescriber/PCP: 5/14/2019 Bright Dominguez MD OR same dept: 10/1/2019 Radha Hobbs MD OR same specialty: 10/1/2019 Radha Hobbs MD  Last physical: 11/17/2017 Last MTM visit: Visit date not found   Next visit within 3 mo: Visit date not found  Next physical within 3 mo: Visit date not found  Prescriber OR PCP: Bright Dominguez MD  Last diagnosis associated with med order: 1. Chronic folliculitis  - cephalexin (KEFLEX) 500 MG capsule [Pharmacy Med Name: CEPHALEXIN 500MG CAPSULES]; TAKE 1 CAPSULE(500 MG) BY MOUTH TWICE DAILY  Dispense: 40 capsule; Refill: 0    2. Hypertension  - metoprolol tartrate (LOPRESSOR) 25 MG tablet; TAKE 1 TABLET BY MOUTH TWICE DAILY  Dispense: 180 tablet; Refill: 1    If protocol passes may refill for 12 months if within 3 months of last provider visit (or a total of 15 months).             Passed - Blood pressure filed in past 12 months     BP Readings from Last 1 Encounters:   10/01/19 136/88

## 2021-06-08 NOTE — PROGRESS NOTES
OFFICE VISIT NOTE      Assessment & Plan   Ray Grubbs is a 40 y.o. male here to have his foot pain evaluated. It appears to me that he has a wart which has grown to have a very deep callus which then caused his pain. I shaved off a lot of the callus so that I could see the wart surface and then I froze the wart x 3. He understands he has to return for additional treatments in order to get the wart to fully go away. He admitted, however, that there was much less pain with standing and walking after the procedure today.  He'll work on treating his athlete's foot, too, with cream treatment and powder in his steel-toed shoes.      Diagnoses and all orders for this visit:    Essential hypertension  -     HM2(CBC w/o Differential)  -     Comprehensive Metabolic Panel  -     Lipid Cascade RANDOM  -     Glycosylated Hemoglobin A1c  -     Urinalysis-UC if Indicated    Morbid obesity (H)  -     HM2(CBC w/o Differential)  -     Comprehensive Metabolic Panel  -     Lipid Cascade RANDOM  -     Glycosylated Hemoglobin A1c  -     Urinalysis-UC if Indicated  -     Thyroid Stimulating Hormone (TSH)    Elevated glucose  -     HM2(CBC w/o Differential)  -     Comprehensive Metabolic Panel  -     Lipid Cascade RANDOM  -     Glycosylated Hemoglobin A1c  -     Urinalysis-UC if Indicated    Tinea pedis of both feet  -     HM2(CBC w/o Differential)  -     Comprehensive Metabolic Panel  -     Lipid Cascade RANDOM  -     Glycosylated Hemoglobin A1c  -     Urinalysis-UC if Indicated    work excuse letter written. He requested it be until 6/15    Maria Eugenia Toth MD    The following high BMI interventions were performed this visit: encouragement to exercise          Subjective:   Chief Complaint:  Foot Pain (foreign body under skin of foot, painful when stepping. Needs blooddraw)    40 y.o. male.     1) pain in his right foot - not sure what from. It's been there well over a month - even longer. He tried cutting it but that did not  "help. He's tried to get his family to look at it and they won't. He is having great difficulty walking and would like some help.    2) he works in steel toed boots and cannot avoid athlete's foot. He's had it a long time and has done nothing for it.    3) he knows he needs his toenails cut  4) he's gained weight. Dr. Dominguez wants labs done  5) he told his work he was feeling hot and worried he had the coronavirus. They immediately sent him home. He needs a work excuse letter    Current Outpatient Medications   Medication Sig     amLODIPine (NORVASC) 10 MG tablet Take 1 tablet (10 mg total) by mouth daily.     cephalexin (KEFLEX) 500 MG capsule TAKE 1 CAPSULE(500 MG) BY MOUTH TWICE DAILY     clobetasol (TEMOVATE) 0.05 % Gel Apply to affected area twice daily     metoprolol tartrate (LOPRESSOR) 25 MG tablet TAKE 1 TABLET BY MOUTH TWICE DAILY       PSFHx: appropriate sections of PMH completed/filled in   Tobacco Status:  He  reports that he has never smoked. He has never used smokeless tobacco.    Review of Systems:  No fever. Lots of anxiety over the coronavirus. All other systems negative except as noted above.    Objective:    /88   Pulse 66   Temp 98.4  F (36.9  C) (Oral)   Resp 18   Ht 5' 8\" (1.727 m)   Wt (!) 314 lb 6.4 oz (142.6 kg)   SpO2 97%   BMI 47.80 kg/m    GENERAL: No acute distress.  Mood: anxious- mostly about the coronavirus  Skin: feet are flakey all over, on the bottom of the right foot, about mid-sole, just medial to the lateral weight-bearing area, is a callus with a small [place] centrally with a black speck centrally. Underneath that, the sin is blanched.  Toenails - all are overgrown, the great toenails are wavy    With the patient's verbal consent, I had him soak his foot 10 min and then I pared down the callus (I estimate I took off over 1/2 cm of callus, which removed the black speck and revealed a wart. I then did 3 freeze-thaw cycles with liquid nitrogen and put triple antibiotic " and then bandaids on, as there was one very small area that was very slowly bleeding. The procedure was tolerated well.    After the above procedure, I trimmed all 10 toenails as they were mis-shaped and quite overgrown.    I spent 20min talking to the patient about treatments/self care and obesity. In addition to that I trimmed a very large callus (approx 5cm in diameter and 0.5cm thick) and treated the wart within that callus. In addition to that I trimmed all 10 toenails.

## 2021-06-08 NOTE — PROGRESS NOTES
Walk IN Clinic Note    CC: Right eye is blind spot    HPI:   Ray Grubbs is a 37 y.o. old male without previous medical history palpation.  He haven't seen a doctor for a while.  Patient came to the walk-in clinic with concern of right eye blurry and blind spot in the middle of his vision.  Patient reports this started yesterday around 11 AM.  The vision changes is intermittent.  He also noticed intermittent headaches in the back of his head about an hour ago.  When he arrived in the walk-in clinic initial blood pressure was 230/148.  Patient denied any shortness of breath, chest pain, edema, abdominal pain, not taking any drugs.      Review of Systems   10-point review of systems negative except as noted above.    Past Medical History  There are no active problems to display for this patient.      No past medical history on file.    Medications   No current outpatient prescriptions on file prior to visit.     No current facility-administered medications on file prior to visit.          Physical Exam:  Visit Vitals     BP (!) 188/118     Pulse 93     Temp 98.6  F (37  C) (Oral)     Resp 16     Wt (!) 290 lb 6.4 oz (131.7 kg)     SpO2 99%     General appearance: alert, appears stated age and cooperative  Head: Normocephalic, without obvious abnormality, atraumatic  Eyes: negative findings: conjunctivae and sclerae normal and Bilateral pupil equal reactive to light.  diffiult to perform adequate funduscopic eye exam.  No gross abnormality  Neck: no JVD and thyroid not enlarged, symmetric, no tenderness/mass/nodules  Lungs: clear to auscultation bilaterally  Heart: regular rate and rhythm, S1, S2 normal, no murmur, click, rub or gallop  Extremities: extremities normal, atraumatic, no cyanosis or edema    Assessment/Plan:   Discussing with patient and also [facility] ER provider.  Recheck blood pressure is 188/118.  Patient and I agreed to safest course would be that patient evaluated at the ER for treatment of  hypertension urgency and also further evaluation of headaches as well as changes in his vision

## 2021-06-08 NOTE — TELEPHONE ENCOUNTER
Please call Ray. Let him know his test results show  1) slight kidney disease. Drinking plenty of water daily should help this.  2) elevated cholesterol - we recommend avoiding fatty foods including meat plus regular exercise to help this.    Dr. Toth hopes his foot is feeling better AND that he'll return for more treatment (he has an appt with Dr. Dominguez 7/1) so the foot can return to normal.

## 2021-06-08 NOTE — PROGRESS NOTES
ASSESSMENT AND PLAN:  1. Hypertension  His blood pressure still elevated, he's been taking his medications faithfully, and increase his metoprolol to 25 mg twice a day.  He'll have a recheck of his blood pressure this week by a nurse it remains elevated we will need to add another agent.  No side effects noted from medication.  He has returned to work.  - Basic Metabolic Panel    2. Acute renal failure, unspecified acute renal failure type  Repeating BMP today he stopped all and states he is informed that he can only use Tylenol for back pain if it occurs.  no oliguria.  Checking an A1c as it is a family history of diabetes on the paternal side.    3. Hypertensive retinopathy of left eye  He says his vision is improved slightly over the discussed his scan results including his MRI, he still has not seen an ophthalmologist.  Will get a make a stat visit for him today.      4.  Obesity we discussed a weight loss plan, he needs to start exercising so beneficial effects to reduce his blood pressure.  He maintains that since his job changes gained about 15 pounds or discuss decreasing alcohol intake and avoiding fast food    5.  Chronic folliculitis he has follicular side effects the skin on the back of his neck and scalp.  I restarted Keflex for follow-up in 4 weeks side effects of medication discussed in detail.    Orders Placed This Encounter   Procedures     Basic Metabolic Panel     There are no discontinued medications.    No Follow-up on file.    CHIEF COMPLAINT:  Chief Complaint   Patient presents with     Follow Up     [facility]        HISTORY OF PRESENT ILLNESS:  Ray is a 37 y.o. male presenting an inpatient follow-up.He presented to the [place] in walk-in clinic on 02/09/2017 for left eye blurry vision with spot and lines in visual field. He was found to have blood pressure of 230/148 and was referred to the emergency room. He was then admitted and treated with a labetalol drip in ICU. He was  "consulted by neurology and a head MRI was done which showed No acute infarct, mass, mass effect, or hemorrhage but a mild enlargement of ventricles. A renal ultrasound was also done which was negative for any acute changes. He had a cardiology consult and his ECHO revealed an ejection fraction of 50%. He was discharged on 2/11/2017 with a prescription for amlodipine and metoprolol. He will follow up with ophthalmology. Currently, he states that he has been taking his medications faithfully. He states that his vision has not improved.     Health Maintenance: He states he has not had a physical for about 18 years.     REVIEW OF SYSTEMS:   He states that he has bumps over the back of his neck. He notes that he saw a dermatologist about 5 years ago for this issue and was given antibiotics.    He states that he used to take NSAIDs everyday for his intermittent back pain.    He states that he has not been eating healthy.   All other 10 point review of systems are negative.    PFSH:  Paternal uncle has history of diabetes. Paternal history of hypertension. 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 Reviewed as below.     TOBACCO USE:  History   Smoking Status     Never Smoker   Smokeless Tobacco     Never Used       VITALS:  Vitals:    02/14/17 0939 02/14/17 0941   BP: (!) 148/120 (!) 158/121   Patient Site: Right Arm Right Arm   Patient Position: Sitting Sitting   Cuff Size: Adult Large Adult Large   Pulse: 72    Resp: 20    Temp: 98.3  F (36.8  C)    TempSrc: Oral    Weight: (!) 285 lb (129.3 kg)    Height: 5' 9\" (1.753 m)      Wt Readings from Last 3 Encounters:   02/14/17 (!) 285 lb (129.3 kg)   02/11/17 (!) 286 lb 6.4 oz (129.9 kg)   02/09/17 (!) 290 lb 6.4 oz (131.7 kg)     Body mass index is 42.09 kg/(m^2).    PHYSICAL EXAM:  General: Alert, cooperative, no distress, appears stated age  Head: Normocephalic, without obvious abnormality, atraumatic  Lungs: Clear to auscultation bilaterally, respirations unlabored  Chest wall: No " tenderness or deformity  Heart: Regular rate and rhythm, S1 and S2 normal, no murmur, rub, or gallop  Skin: Multiple erythematus papules over occiput and back of neck   Neurologic:  A & O x 3.  No tremor, no focal findings.       DATA REVIEWED:  Additional History from Old Records Summarized (2): Reviewed Dr. Mann's note from 02/11/2017 regarding discharge. Reviewed Dr. Og's note from 2/10/2017 regarding Echo.   Decision to Obtain Records (1): None  Radiology Tests Summarized or Ordered (1): Reviewed Head MRI from 2/11/2017.   Labs Reviewed or Ordered (1): Reviewed Labs from 2/11/2017.   Medicine Test Summarized or Ordered (1): Reviewed Echo report from 2/10/2017  Independent Review of EKG or X-RAY(2 each): None    The visit lasted a total of 23 minutes face to face with the patient. Over 50% of the time was spent counseling and educating the patient about hypertension.     ILiban, am scribing for and in the presence of, Dr. Dominguez.    I, Dr. Dominguez, personally performed the services described in this documentation, as scribed by Liban Mosqueda in my presence, and it is both accurate and complete.      MEDICATIONS:  Current Outpatient Prescriptions   Medication Sig Dispense Refill     amLODIPine (NORVASC) 10 MG tablet Take 1 tablet (10 mg total) by mouth daily. 60 tablet 0     metoprolol tartrate (LOPRESSOR) 25 MG tablet Take 0.5 tablets (12.5 mg total) by mouth 2 (two) times a day. 60 tablet 0     No current facility-administered medications for this visit.        Total Data Points: 5

## 2021-06-08 NOTE — PROGRESS NOTES
"Ray Grubbs is a 40 y.o. male who is being evaluated via a billable telephone visit.      The patient has been notified of following:     \"This telephone visit will be conducted via a call between you and your physician/provider. We have found that certain health care needs can be provided without the need for a physical exam.  This service lets us provide the care you need with a short phone conversation.  If a prescription is necessary we can send it directly to your pharmacy.  If lab work is needed we can place an order for that and you can then stop by our lab to have the test done at a later time.    Telephone visits are billed at different rates depending on your insurance coverage. During this emergency period, for some insurers they may be billed the same as an in-person visit.  Please reach out to your insurance provider with any questions.    If during the course of the call the physician/provider feels a telephone visit is not appropriate, you will not be charged for this service.\"    Patient has given verbal consent to a Telephone visit? Yes    What phone number would you like to be contacted at? [phone]     Patient would like to receive their AVS by mail    Additional provider notes:     40-year-old male with medical history significant for morbid obesity and hypertension, chronic folliculitis and past medical history significant for DVT in the right lower extremity following up today via phone visit to discuss his increasing weight gain and pain in his right foot.    He states he is gained 5 pounds over the last 6 months despite trying to diet and use protein shakes.  He said he tried one preparation which made him feel shaky and could affected his sleep so he stopped that medication he says close feel slightly tighter but he denies any shortness of breath chest pain or dizziness.  He continues to work.  He continues to take his medications for hypertension.  He admits he still not " watching the sodium in his diet.    Patient has noticed a pain in the bottom of his right foot for at least 2 months he thinks he may have stepped on something at home and is tried to digitally remove the foreign body with tweezers.  He says he feels a ball and bump on the lower aspect of his right foot.  It hurts when he stands and walks but he has not missed any work, he notes no pus or drainage from the site.  He says it is tender when he places weight on his right foot he denies any numbness in his right foot right ankle or right leg.    Assessment/Plan:  1. Morbid obesity (H)  Last weight at home was 320 pounds he has gained weight despite being active at work he will need to practice diet restriction.  I will do some basic labs including his lipid panel when he returns to clinic    2. Chronic folliculitis  Taking Keflex regularly.  No side effects noted.    3. Essential hypertension  He has been taking both amlodipine and metoprolol I will check a BMP when he comes to clinic next.    4. Foreign body in right foot, initial encounter  He will be seen in a face-to-face encounter as he may require an incision and drainage of a foreign body in his right foot.        Phone call duration:  25 minutes    Bright Dominguez MD

## 2021-06-09 NOTE — TELEPHONE ENCOUNTER
RN cannot approve Refill Request    RN can NOT refill this medication med is not covered by policy/route to provider. Last office visit: 7/1/2020 Bright Dominguez MD Last Physical: 11/17/2017 Last MTM visit: Visit date not found Last visit same specialty: 7/1/2020 Bright Dominguez MD.  Next visit within 3 mo: Visit date not found  Next physical within 3 mo: Visit date not found      Estefania Mahmood, [facility] Triage/Med Refill 7/25/2020    Requested Prescriptions   Pending Prescriptions Disp Refills     cephalexin (KEFLEX) 500 MG capsule [Pharmacy Med Name: CEPHALEXIN 500MG CAPSULES] 40 capsule 0     Sig: TAKE 1 CAPSULE(500 MG) BY MOUTH TWICE DAILY       There is no refill protocol information for this order

## 2021-06-09 NOTE — PROGRESS NOTES
ASSESSMENT and plan  1. Morbid obesity (H)  He is consciously trying to change his eating habits he is lost 4 pounds since his last visit.  He is going to try to lose 10 pounds in the next 2 months, I reviewed all his lab tests from his last visit with him today    2. Essential hypertension  Blood pressure slightly elevated since his last visit, however I believe that with salt restriction and exercise and weight loss we can continue with the same regimen I will recheck his blood pressure in 2 months    3. Chronic folliculitis  His skin is not itchy at this time I refilled his clobetasol  - clobetasoL (TEMOVATE) 0.05 % Gel; Apply to affected area twice daily  Dispense: 60 each; Refill: 1    4. Impacted cerumen of right ear  Impacted wax was removed with lavage today.    5. Primary snoring  He is could have a family member recording while he sleeping he has poor sleep quality and may need sleep study for possible sleep apnea    6. Plantar wart  No right-sided foot pain noted he can walk without limping no referral needed to podiatry      There are no Patient Instructions on file for this visit.    No orders of the defined types were placed in this encounter.    Medications Discontinued During This Encounter   Medication Reason     clobetasol (TEMOVATE) 0.05 % Gel Reorder       No follow-ups on file.    CHIEF COMPLAINT:  Chief Complaint   Patient presents with     Follow-up     wart        HISTORY OF PRESENT ILLNESS:  Ray is a 40 y.o. male with a medical history of obesity, chronic folliculitis and hypertension.  He is returning today to follow-up for a right foot wart that was treated by Dr. hanson he reports that he now walk without pain and there is no swelling there.  Initially the area was tender after she pared down the wart but now he feels much better.  He is been taking his blood pressure medication and vitamins and is attempting to lose weight.  He says he does not sleep particularly well and he also  "notes that his right ear has been plugged for more than a week his family members have been complaining that he cannot hear out of that ear.    He denies any chest pain cough or weakness he says his energy level is fine.     REVIEW OF SYSTEMS:   General has fatigue because of poor sleep otherwise 10 point review of  All other systems are negative.    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[employer], he drives a forklift    TOBACCO USE:  Social History     Tobacco Use   Smoking Status Never Smoker   Smokeless Tobacco Never Used       VITALS:  Vitals:    07/01/20 0825   BP: (!) 148/100   Pulse: 68   Resp: 20   Temp: 98  F (36.7  C)   TempSrc: Oral   SpO2: 96%   Weight: (!) 310 lb 9 oz (140.9 kg)   Height: 5' 8\" (1.727 m)     Wt Readings from Last 3 Encounters:   07/01/20 (!) 310 lb 9 oz (140.9 kg)   06/10/20 (!) 314 lb 6.4 oz (142.6 kg)   10/01/19 (!) 316 lb 11.2 oz (143.7 kg)       PHYSICAL EXAM:  Interactive obese -American male sitting comfortably in exam room no acute distress patient  HEENT neck supple mucous members moist oral cavity is no exudate he does have an enlarged uvula, he has erythema of the posterior pharyngeal wall.  Left TM can be visualized right TM partially visualized because of cerumen occlusion  CVS regular rate and rhythm no murmurs rubs or gallops  Respiratory system clear to auscultation equal breath sounds no wheeze no crackles  Skin solitary wart noted at the sole of his right foot it is nontender it is flat it measures less than 0.1 cm in size    DATA REVIEWED:  Additional History from Old Records Summarized (2): Reviewed his last clinical visit with Dr. hanson she trimmed his nails and pared down his plantar wart  Decision to Obtain Records (1): 0  Radiology Tests Summarized or Ordered (1): 0  Labs Reviewed or Ordered (1): 1 lipid panel shows elevated triglycerides low HDL, TSH is normal hemoglobin A1c was normal.  BMP shows GFR 58  Medicine Test Summarized or Ordered (1): " 0  Independent Review of EKG or X-RAY(2 each): 0    The visit lasted a total of 25 minutes face to face with the patient. Over 50% of the time was spent counseling and educating the patient about     Data points 3  MEDICATIONS:  Current Outpatient Medications   Medication Sig Dispense Refill     amLODIPine (NORVASC) 10 MG tablet Take 1 tablet (10 mg total) by mouth daily. 90 tablet 2     cephalexin (KEFLEX) 500 MG capsule TAKE 1 CAPSULE(500 MG) BY MOUTH TWICE DAILY 40 capsule 0     clobetasoL (TEMOVATE) 0.05 % Gel Apply to affected area twice daily 60 each 1     metoprolol tartrate (LOPRESSOR) 25 MG tablet TAKE 1 TABLET BY MOUTH TWICE DAILY 180 tablet 1     No current facility-administered medications for this visit.      Roberto BALBUENA

## 2021-06-11 NOTE — TELEPHONE ENCOUNTER
RN cannot approve Refill Request    RN can NOT refill this medication med is not covered by policy/route to provider. Last office visit: 7/1/2020 Bright Dominguez MD Last Physical: 11/17/2017 Last MTM visit: Visit date not found Last visit same specialty: 7/1/2020 Bright Dominguez MD.  Next visit within 3 mo: Visit date not found  Next physical within 3 mo: Visit date not found      Trini Francis, [facility] Triage/Med Refill 9/15/2020    Requested Prescriptions   Pending Prescriptions Disp Refills     cephalexin (KEFLEX) 500 MG capsule [Pharmacy Med Name: CEPHALEXIN 500MG CAPSULES] 40 capsule 0     Sig: TAKE 1 CAPSULE(500 MG) BY MOUTH TWICE DAILY       There is no refill protocol information for this order

## 2021-06-12 NOTE — TELEPHONE ENCOUNTER
Refill Approved    Rx renewed per Medication Renewal Policy. Medication was last renewed on 5/4/20, last OV 11/5/20.    Estefania Mahmood, [facility] Triage/Med Refill 11/8/2020     Requested Prescriptions   Pending Prescriptions Disp Refills     metoprolol tartrate (LOPRESSOR) 25 MG tablet [Pharmacy Med Name: METOPROLOL TARTRATE 25MG TABLETS] 180 tablet 1     Sig: TAKE 1 TABLET BY MOUTH TWICE DAILY       Beta-Blockers Refill Protocol Passed - 11/5/2020  3:54 AM        Passed - PCP or prescribing provider visit in past 12 months or next 3 months     Last office visit with prescriber/PCP: 7/1/2020 Bright Dominguez MD OR same dept: 7/1/2020 Bright Dominguez MD OR same specialty: 7/1/2020 Bright Dominguez MD  Last physical: 11/17/2017 Last MTM visit: Visit date not found   Next visit within 3 mo: 11/5/2020 Bright Dominguez MD  Next physical within 3 mo: Visit date not found  Prescriber OR PCP: Bright Dominguez MD  Last diagnosis associated with med order: 1. Hypertension  - metoprolol tartrate (LOPRESSOR) 25 MG tablet [Pharmacy Med Name: METOPROLOL TARTRATE 25MG TABLETS]; TAKE 1 TABLET BY MOUTH TWICE DAILY  Dispense: 180 tablet; Refill: 1    If protocol passes may refill for 12 months if within 3 months of last provider visit (or a total of 15 months).             Passed - Blood pressure filed in past 12 months     BP Readings from Last 1 Encounters:   11/06/20 139/71

## 2021-06-12 NOTE — ANESTHESIA POSTPROCEDURE EVALUATION
Patient: Ray Grubbs  Procedure(s):  APPENDECTOMY, LAPAROSCOPIC  Anesthesia type: general    Patient location: PACU  Last vitals:   Vitals Value Taken Time   /63 11/05/20 2020   Temp 37  C (98.6  F) 11/05/20 2010   Pulse 90 11/05/20 2030   Resp 24 11/05/20 2030   SpO2 97 % 11/05/20 2030     Post vital signs: stable  Level of consciousness: awake and responds to simple questions  Post-anesthesia pain: pain controlled  Post-anesthesia nausea and vomiting: no  Pulmonary: unassisted, return to baseline  Cardiovascular: stable and blood pressure at baseline  Hydration: adequate  Anesthetic events: no    QCDR Measures:  ASA# 11 - Lori-op Cardiac Arrest: ASA11B - Patient did NOT experience unanticipated cardiac arrest  ASA# 12 - Lori-op Mortality Rate: ASA12B - Patient did NOT die  ASA# 13 - PACU Re-Intubation Rate: ASA13B - Patient did NOT require a new airway mgmt  ASA# 10 - Composite Anes Safety: ASA10A - No serious adverse event    Additional Notes:

## 2021-06-12 NOTE — PROGRESS NOTES
ASSESSMENT and plan   1. Essential hypertension  Patient was unable to take his blood pressure medication yesterday because he was feeling ill he took it today.  His blood pressure is normotensive.  He denies any headaches or chest pain.    2. Abdominal pain, generalized    Pain is been noted continuously since yesterday.  It is located both the epigastric and the right lower quadrant of the abdomen.  The differential would include a perforated ulcer, GERD, cholelithiasis, appendicitis, aneurysm or a GI bleed, other considerations given his recent consumption of fast food would include gastroenteritis because the pain is severe and reproducible and he has been unable to eat I have asked him to go to the ER today for urgent evaluation.  I did call Dr. Velasquez from the ED department and she is awaiting his arrival    3. Nausea and vomiting, intractability of vomiting not specified, unspecified vomiting type  Patient was nauseated today and had 2 bouts of emesis.  He denies having symptoms yesterday he has no fever or chills.  He will go to the ER to be evaluated he is going by private car and is not driving      There are no Patient Instructions on file for this visit.    No orders of the defined types were placed in this encounter.    There are no discontinued medications.    No follow-ups on file.    CHIEF COMPLAINT:  Chief Complaint   Patient presents with     Abdominal Pain       HISTORY OF PRESENT ILLNESS:  Ray is a 40 y.o. male who is here today because he had abdominal pain of more than 24 hours duration.  The pain started Wednesday morning and he attempted to go to work and had to leave after approximately 6 minutes the pain is continuous and present in the middle of his abdomen and radiates down to his abdomen.  Is worse when he moves.  He said the he had 3 bouts of vomiting yesterday and today and that made the pain decreased for a small amount of time.  On Tuesday night he had fast food.  He denies having  "a fever or chills.  He denies having chest pain.  He has no shortness of breath.  He said the pain was severe enough that he could not sleep last night.  He has never had this type of pain before.    REVIEW OF SYSTEMS:   GI positive for abdominal pain in the epigastric and the right lower quadrant of the abdomen.  Also positive for nausea and vomiting  According to patient 10 point review  All other systems are negative.    PFSH:    He works at the [employer]    TOBACCO USE:  Social History     Tobacco Use   Smoking Status Never Smoker   Smokeless Tobacco Never Used       VITALS:  Vitals:    11/05/20 1149   BP: 144/76   Pulse: 88   Resp: 20   Temp: 99.1  F (37.3  C)   TempSrc: Oral   Weight: (!) 310 lb 2 oz (140.7 kg)   Height: 5' 8\" (1.727 m)     Wt Readings from Last 3 Encounters:   11/05/20 (!) 310 lb (140.6 kg)   11/05/20 (!) 310 lb 2 oz (140.7 kg)   07/01/20 (!) 310 lb 9 oz (140.9 kg)       PHYSICAL EXAM:  Interactive male sitting in exam room seems to be in minor distress looks fatigued  HEENT neck supple mucous membranes slightly dry there is no lymph enlargement noted in neck  Respiratory system clear to auscultation equal breath sounds no wheeze no crackles  CVS regular rate and rhythm no murmurs rubs gallops appreciated  Abdomen tenderness elicited with palpation in the epigastric area, he is tender over the right upper and lower quadrants of the abdomen.  No masses detected bowel sounds are present    DATA REVIEWED:  Additional History from Old Records Summarized (2): 0  Decision to Obtain Records (1): 0  Radiology Tests Summarized or Ordered (1): 0  Labs Reviewed or Ordered (1): 0  Medicine Test Summarized or Ordered (1): 0  Independent Review of EKG or X-RAY(2 each): 0    The visit lasted a total of 15 minutes face to face with the patient. Over 50% of the time was spent counseling and educating the patient about   Abdominal pain and nausea and vomiting.    MEDICATIONS:  No current " facility-administered medications for this visit.      Current Outpatient Medications   Medication Sig Dispense Refill     amLODIPine (NORVASC) 10 MG tablet Take 1 tablet (10 mg total) by mouth daily. 90 tablet 2     cephalexin (KEFLEX) 500 MG capsule TAKE 1 CAPSULE(500 MG) BY MOUTH TWICE DAILY 40 capsule 0     clobetasoL (TEMOVATE) 0.05 % Gel Apply to affected area twice daily 60 each 1     metoprolol tartrate (LOPRESSOR) 25 MG tablet TAKE 1 TABLET BY MOUTH TWICE DAILY 180 tablet 1     Facility-Administered Medications Ordered in Other Visits   Medication Dose Route Frequency Provider Last Rate Last Dose     morphine injection 4 mg  4 mg Intravenous Once Laurie Velasquez MD         naloxone injection 0.2-0.4 mg (NARCAN)  0.2-0.4 mg Intravenous PRN Laurie Velasquez MD        Or     naloxone injection 0.2-0.4 mg (NARCAN)  0.2-0.4 mg Intramuscular PRN Laurie Velasquez MD         ondansetron injection 4 mg (ZOFRAN)  4 mg Intravenous Once Laurie Velasquez MD         sodium chloride flush 10 mL (NS)  10 mL Intravenous PRN Laurie Velasquez MD Anilkumar MD

## 2021-06-12 NOTE — ANESTHESIA PREPROCEDURE EVALUATION
Anesthesia Evaluation        Airway    Pulmonary                           Cardiovascular   (+) hypertension well controlled, ,        ROS comment: 2017 TTE  1. The left ventricle is normal in size. Left ventricular systolic performance is at the lower limits of normal. The ejection fraction is estimated to be 50%.   2. There is moderate concentric increase in left ventricular wall thickness.  3. No significant valvular heart disease is identified on this study.   4. Normal right ventricular size and systolic performance.      Neuro/Psych      Endo/Other    (+) obesity,      GI/Hepatic/Renal    (+)   chronic renal disease,      Other findings: Results for PATRICIA PURDY (MRN [id number]) as of 11/5/2020 17:42    11/5/2020 12:50  Sodium: 138  Potassium: 3.8  Chloride: 99  CO2: 27  Anion Gap, Calculation: 12  BUN: 14  Creatinine: 1.58 (H)  GFR MDRD Af Amer: 59 (L)  GFR MDRD Non Af Amer: 48 (L)  Results for PATRICIA PURDY (MRN [id number]) as of 11/5/2020 17:42    11/5/2020 12:50  WBC: 8.3  RBC: 5.32  Hemoglobin: 15.2  Hematocrit: 48.4  MCV: 91  MCH: 28.6  MCHC: 31.4 (L)  RDW: 12.5  Platelets: 263        Dental                         Anesthesia Plan  Planned anesthetic: general endotracheal  GAETT  RSI  Antiemetics  COVID test is NOT processed yet    ASA 3   Induction: intravenous   Anesthetic plan and risks discussed with: patient and spouse  Anesthesia plan special considerations: rapid sequence induction, antiemetics,   Post-op plan: routine recovery

## 2021-06-12 NOTE — ANESTHESIA CARE TRANSFER NOTE
Last vitals:   Vitals:    11/05/20 1940   BP: 136/72   Pulse: 91   Resp: 20   Temp:    SpO2: 96%     Patient's level of consciousness is drowsy  Spontaneous respirations: yes  Maintains airway independently: yes  Dentition unchanged: yes  Oropharynx: oral airway in place    QCDR Measures:  ASA# 20 - Surgical Safety Checklist: WHO surgical safety checklist completed prior to induction    PQRS# 430 - Adult PONV Prevention: 4558F - Pt received => 2 anti-emetic agents (different classes) preop & intraop  ASA# 8 - Peds PONV Prevention: NA - Not pediatric patient, not GA or 2 or more risk factors NOT present  PQRS# 424 - Lori-op Temp Management: 4559F - At least one body temp DOCUMENTED => 35.5C or 95.9F within required timeframe  PQRS# 426 - PACU Transfer Protocol: - Transfer of care checklist used  ASA# 14 - Acute Post-op Pain: ASA14B - Patient did NOT experience pain >= 7 out of 10

## 2021-06-13 NOTE — TELEPHONE ENCOUNTER
RN cannot approve Refill Request    RN can NOT refill this medication med is not covered by policy/route to provider. Last office visit: 11/5/2020 Bright Dominguez MD Last Physical: 11/17/2017 Last MTM visit: Visit date not found Last visit same specialty: 11/10/2020 Rubi Ponce, .  Next visit within 3 mo: Visit date not found  Next physical within 3 mo: Visit date not found      Trini Francis, [facility] Triage/Med Refill 11/20/2020    Requested Prescriptions   Pending Prescriptions Disp Refills     cephalexin (KEFLEX) 500 MG capsule [Pharmacy Med Name: CEPHALEXIN 500MG CAPSULES] 40 capsule 0     Sig: TAKE 1 CAPSULE(500 MG) BY MOUTH TWICE DAILY       There is no refill protocol information for this order

## 2021-06-13 NOTE — PROGRESS NOTES
ASSESSMENT and plan   1. Essential hypertension blood pressure is not well controlled currently he has been only taking metoprolol.  He has gained 10 pounds since his last visit.  I will recheck his blood pressure within 6 weeks.  I have started him on chlorthalidone he will continue the metoprolol he will hold the amlodipine. chlorthalidone (HYGROTEN) 25 MG tablet   2. Morbid obesity (H) he has gained 10 pounds since his last visit have asked him to maintain strict dietary control this also means that he cannot have prepared foods heavily laced with sodium.  He needs to stop working overtime because he tends to eat out and have fast food more.    3. Chronic folliculitis cephalexin is already been refilled he has no current skin issues    4. Acute appendicitis with localized peritonitis, without perforation, abscess, or gangrene I filled out [place] paperwork for him apparently his company has threatened to fire him if he has 2 more absences, they did honor the work restrictions for weight reduction and lifting for 6 days only.          Patient Instructions   Please stop norvasc (amlodipine)  Continue to take lopressor twice daily  Take new medication chlorthalidone 25 mg daily      No orders of the defined types were placed in this encounter.    There are no discontinued medications.    Return in about 2 months (around 2/10/2021) for hypertension.    CHIEF COMPLAINT:  Chief Complaint   Patient presents with     BP       HISTORY OF PRESENT ILLNESS:  Ray is a 40 y.o. male who is following up today.  He saw Dr. Dahl after his hospital discharge for acute appendicitis.  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  He needs that form filled out.  He states that his blood  "pressure is also high today and he does not understand that.  He denies any chest pain but says he was short of breath 2 days ago denies any cough.  He says multiple workers at the [employer] have had a cough but there is sent away and he is not sure if they had Covid.  He denies having a cough weakness headaches.  He is always wearing a mask.    REVIEW OF SYSTEMS:   12 point review  All other systems are negative.    PFSH:  Works at the [employer]    TOBACCO USE:  Social History     Tobacco Use   Smoking Status Never Smoker   Smokeless Tobacco Never Used       VITALS:  Vitals:    12/10/20 0926   BP: (!) 150/106   Pulse: 68   Resp: 18   Temp: 98.2  F (36.8  C)   TempSrc: Oral   Weight: (!) 314 lb 7 oz (142.6 kg)   Height: 5' 8\" (1.727 m)     Wt Readings from Last 3 Encounters:   12/10/20 (!) 314 lb 7 oz (142.6 kg)   11/10/20 (!) 304 lb 12 oz (138.2 kg)   11/05/20 (!) 306 lb 9.6 oz (139.1 kg)       PHYSICAL EXAM:  Interactive male sitting [place] exam in no acute distress  HEENT neck supple mucous members moist oral cavity shows no exit erythema  Respiratory system clear to auscultation equal breath sounds no wheeze no crackles  Skin warm well perfused no evidence of active folliculitis on the back of his scalp  DATA REVIEWED:  Additional History from Old Records Summarized (2): 2  Reviewed last clinic note by Dr. Najera.  He was normotensive at that visit.  Decision to Obtain Records (1): 0  Radiology Tests Summarized or Ordered (1): 0  Labs Reviewed or Ordered (1): 1  Labs reviewed from last visit BMP within normal limits  Medicine Test Summarized or Ordered (1): 0  Independent Review of EKG or X-RAY(2 each): 0    The visit lasted a total of 22 minutes face to face with the patient. Over 50% of the time was spent counseling and educating the patient about   Hypertension, folliculitis obesity.    MEDICATIONS:  Current Outpatient Medications   Medication Sig Dispense Refill     acetaminophen (TYLENOL) 500 MG " tablet Take 1-2 tablets (500-1,000 mg total) by mouth every 6 (six) hours as needed. Max 8 tablets per day  0     amLODIPine (NORVASC) 10 MG tablet TAKE 1 TABLET(10 MG) BY MOUTH DAILY 90 tablet 2     calcium carb-mag oxide-zinc ox 334-134-5 mg Tab Take 1 tablet by mouth daily.       cephalexin (KEFLEX) 500 MG capsule TAKE 1 CAPSULE(500 MG) BY MOUTH TWICE DAILY 40 capsule 0     chlorthalidone (HYGROTEN) 25 MG tablet Take 1 tablet (25 mg total) by mouth daily. 30 tablet 3     metoprolol tartrate (LOPRESSOR) 25 MG tablet TAKE 1 TABLET BY MOUTH TWICE DAILY 180 tablet 3     NON FORMULARY Daily Beet supplement       oxyCODONE (ROXICODONE) 5 MG immediate release tablet Take 0.5-1 tablets (2.5-5 mg total) by mouth every 6 (six) hours as needed for pain. 5 tablet 0     No current facility-administered medications for this visit.      Roberto BALBUENA

## 2021-06-13 NOTE — PROGRESS NOTES
Hospital Follow-up Visit:    Assessment/Plan:     Ray was seen today for hospital visit follow up.    Hospital discharge follow-up: Patient has had no acute transition home.  His pain is well managed with Tylenol at this time.  Patient needs follow-up labs for DIEGO, leukocytosis, and albuminemia.  We will collect his today.  Patient's blood pressure continues to be well controlled despite holding one of his antihypertensives.  We will have him follow-up in 1 month for blood pressure check with Dr. Dominguez.  Patient has a blood pressure cuff at home that he will utilize to monitor.  -     HM2(CBC w/o Differential)  -     Basic Metabolic Panel  -     Albumin    Essential hypertension  -     Basic Metabolic Panel    DIEGO (acute kidney injury) (H)  -     Basic Metabolic Panel    Acute appendicitis with localized peritonitis, without perforation, abscess, or gangrene  -     Albumin    Abnormal white blood cell (WBC) count  -     HM2(CBC w/o Differential)    Patient prefers to be called with result.     Rubi Ponce D.O.  Family Medicine     Subjective:     Ray Grubbs is a 40 y.o. male who presents for a hospital discharge follow up.    Hospital/Nursing Home/ Rehab Facility: [facility]  Date of Admission: 11/5/2020  Date of Discharge:11/6/2020  Reason(s) for Admission: Appendicitis            Do you have any problems taking your medication regularly?  None       Have you had any changes in your medication since discharge? None       Have you had any difficulty following your discharge or treatment plan?  No    Summary of hospitalization:  Hospital discharge summary reviewed  Diagnostic Tests/Treatments reviewed.  Follow up needed: CBC, BMP, albumin, blood pressure  Other Healthcare Providers Involved in Patient's Care: Patient Care Team:  Bright Dominguez MD as PCP - General (Family Medicine)  Bright Dominguez MD as Assigned PCP   General surgery    Update since discharge:improved, Patient taking tylenol  "for pain. Not needing oxy. Patient has a blood pressure cuff and has been utilizing it. He is currently taking his metoprolol but has held his other antihypertensives. Patient still has his bandages on. Patient plans to return to work tomorrow. He has restriction in place by his surgeon.   Information from family, SNF, care coordination: None     Post Discharge Medication Reconciliation: discharge medications reconciled, continue medications without change  Plan of care communicated with: patient    Objective:     Vitals:    11/10/20 1154   BP: 128/86   Pulse: 64   Resp: 20   Temp: 98  F (36.7  C)   TempSrc: Oral   SpO2: 97%   Weight: (!) 304 lb 12 oz (138.2 kg)   Height: 5' 8\" (1.727 m)       Physical Exam:  Constitutional: Awake, alert, cooperative, no apparent distress, and appears stated age.  Eyes: Lids and lashes normal, sclera clear, conjunctiva normal.  ENT: Normocephalic, without obvious abnormality, atramatic.  Lungs: No increased work of breathing, good air exchange, clear to auscultation bilaterally, no crackles or wheezing.  Cardiovascular: Regular rate and rhythm, normal S1 and S2, no S3 or S4, and no murmur noted.  Abdomen:  normal bowel sounds, soft, non-distended, bandages removed. Skin is clear with no drainage. incision look good.   Musculoskeletal: No redness, warmth, or swelling of the joints.  Full range of motion noted.   Neurologic: Awake, alert, oriented to name, place and time.  Cranial nerves II-XII are grossly intact and gait is normal.      Coding guidelines for this visit:  Type of Medical   Decision Making Face-to-Face Visit       within 7 Days of discharge Face-to-Face Visit        within 14 days of discharge   Moderate Complexity [id number]   High Complexity [id number]       Electronically signed by Rubi Ponce DO 11/10/20 10:47 AM   "

## 2021-06-13 NOTE — PATIENT INSTRUCTIONS - HE
Please stop norvasc (amlodipine)  Continue to take lopressor twice daily  Take new medication chlorthalidone 25 mg daily

## 2021-06-13 NOTE — TELEPHONE ENCOUNTER
RN cannot approve Refill Request    RN can NOT refill this medication medication not on med list. Last office visit: 11/5/2020 Bright Dominguez MD Last Physical: 11/17/2017 Last MTM visit: Visit date not found Last visit same specialty: 11/10/2020 Rubi Ponce DO.  Next visit within 3 mo: Visit date not found  Next physical within 3 mo: Visit date not found      Estefania Mahmood, [facility] Triage/Med Refill 12/6/2020    Requested Prescriptions   Pending Prescriptions Disp Refills     amLODIPine (NORVASC) 10 MG tablet [Pharmacy Med Name: AMLODIPINE BESYLATE 10MG TABLETS] 90 tablet 2     Sig: TAKE 1 TABLET(10 MG) BY MOUTH DAILY       Calcium-Channel Blockers Protocol Passed - 12/4/2020  3:55 AM        Passed - PCP or prescribing provider visit in past 12 months or next 3 months     Last office visit with prescriber/PCP: 11/5/2020 Bright Dominguez MD OR same dept: 11/10/2020 Rubi Ponce DO OR same specialty: 11/10/2020 Rubi Ponce DO  Last physical: 11/17/2017 Last MTM visit: Visit date not found   Next visit within 3 mo: Visit date not found  Next physical within 3 mo: Visit date not found  Prescriber OR PCP: Bright Doimnguez MD  Last diagnosis associated with med order: 1. Hypertensive emergency  - amLODIPine (NORVASC) 10 MG tablet [Pharmacy Med Name: AMLODIPINE BESYLATE 10MG TABLETS]; TAKE 1 TABLET(10 MG) BY MOUTH DAILY  Dispense: 90 tablet; Refill: 2    If protocol passes may refill for 12 months if within 3 months of last provider visit (or a total of 15 months).             Passed - Blood pressure filed in past 12 months     BP Readings from Last 1 Encounters:   11/10/20 128/86

## 2021-06-13 NOTE — TELEPHONE ENCOUNTER
----- Message from Rubi Ponce, DO sent at 11/11/2020  7:25 AM CST -----  Please call patient with there results. Let them know his kidney numbers have improved back to his normal. His white blood cells and protein have returned to normal showing that his body is responding to the antibiotics well and is fighting off the infection. All good news!    Rubi Ponce

## 2021-06-14 NOTE — PROGRESS NOTES
"ASSESSMENT:  1. Routine general medical examination at a health care facility he refuses influenza vaccination today.  After some reluctance he accepted Tdap today.  Discussed his weight currently he is working overtime which is mandatory he will would consider working less for impedances health further. Lipid Profile   2. Hypertension blood pressures not at goal on metoprolol on amlodipine.  Take the medication faithfully does not smoke.  Rechecking BMP today Basic Metabolic Panel    Ambulatory referral to Nephrology   3. Chronic renal disease creatinine hovering between 1.79-2.04 rechecking today nephrology consult warranted he would qualify for an ACE inhibitor however concerned about his creatinine no history of familial renal disease Ambulatory referral to Nephrology   4. Dental abscess has an abscess present on the right lower jaw and is mentions that he has had pain has been now taking some Aleve for that recently denies any fever chills I did give him a work note so he can see a dentist today    5. Hypertensive emergency  metoprolol tartrate (LOPRESSOR) 25 MG tablet    amLODIPine (NORVASC) 10 MG tablet     CHIEF COMPLAINT:  Chief Complaint   Patient presents with     Annual Exam     fasting for labs and HTN check       HISTORY OF PRESENT ILLNESS:  Ray is a 37 y.o. male presenting to the clinic today for a physical exam.     Hypertension: He states is taking his blood pressure medications faithfully however, he later states that he was not refilled his medications in a \"while\". His blood pressure today is 136/94. He denies chest pain and shortness of breath.     REVIEW OF SYSTEMS:   He notes that he is still experiencing dental pain. He has not made a dental appointment.   He noticed intermittent numbness over his arms bilaterally when he is sleeping.   All other 10 point review of systems are negative.    PFSH:  He notes that his job has become more physical. Pertinent past, family, social and medical " "history reviewed.   History   Smoking Status     Never Smoker   Smokeless Tobacco     Never Used       Family History   Problem Relation Age of Onset     Stroke Father      Hypertension Father      Diabetes Paternal Uncle      Hypertension Paternal Uncle        Social History     Social History     Marital status: Patient Declined     Spouse name: N/A     Number of children: N/A     Years of education: N/A     Occupational History     Not on file.     Social History Main Topics     Smoking status: Never Smoker     Smokeless tobacco: Never Used     Alcohol use Yes      Comment: occasional     Drug use: No     Sexual activity: Not on file     Other Topics Concern     Not on file     Social History Narrative       Past Surgical History:   Procedure Laterality Date     NO PAST SURGERIES         No Known Allergies    Active Ambulatory Problems     Diagnosis Date Noted     Hypertensive emergency 02/09/2017     Hypertensive retinopathy of left eye      Acute renal failure, unspecified acute renal failure type      Mastoiditis of right side      Resolved Ambulatory Problems     Diagnosis Date Noted     No Resolved Ambulatory Problems     No Additional Past Medical History       VITALS:  Vitals:    11/17/17 0951 11/17/17 0954   BP: (!) 146/104 (!) 136/94   Patient Site: Left Arm Left Arm   Patient Position: Sitting Sitting   Cuff Size: Adult Large Adult Large   Pulse: 61    Temp: 97.9  F (36.6  C)    TempSrc: Oral    SpO2: 98%    Weight: (!) 287 lb (130.2 kg)    Height: 5' 9\" (1.753 m)      Wt Readings from Last 3 Encounters:   11/17/17 (!) 287 lb (130.2 kg)   03/14/17 (!) 290 lb 4 oz (131.7 kg)   02/14/17 (!) 285 lb (129.3 kg)     Body mass index is 42.38 kg/(m^2).    PHYSICAL EXAM:  General: Alert, cooperative, no distress, appears stated age  Head: Normocephalic, without obvious abnormality, atraumatic  Eyes: PERRL, conjunctiva/cornea clear, EOM's intact, fundi benign, both eyes  Ears: Normal TM's and external ear canals, " both ears  Nose: Nares normal, septum midline, mucosa normal, no drainage or sinus tenderness  Throat: Rotted tooth present over left molar. Abscess present over gingiva.    Musculoskeletal: Back symmetric, no curvature, ROM normal, no CVA tenderness.  Lungs: Clear to auscultation bilaterally, respirations unlabored  Chest wall: No tenderness or deformity  Heart: Regular rate and rhythm, S1 and S2 normal, no murmur, rub, or gallop  CVS: No edema noted.   Abdomen: Soft, non tender, bowel sounds active all four quadrants, no masses, no organomegaly.  : (Male) no penile lesions or discharge, no testicular masses or tenderness, no hernias  Skin: Thickened skin present over neck. Inflamed follicles present over head.   Neurologic: No tremor, no focal findings.   DTRs are normal and symmetric both proximally and distally BUE and BLE.  Strength testing is normal and symetric both proximally and distally BUE and BLE.  Toes downgoing bilaterally.  Normal gait.   Psych: Oriented x3. Affect normal.     ADDITIONAL HISTORY SUMMARIZED (2): None.  DECISION TO OBTAIN EXTRA INFORMATION (1): None.   RADIOLOGY TESTS (1): None.  LABS (1): Labs ordered.   MEDICINE TESTS (1): None.  INDEPENDENT REVIEW (2 each): None.     The visit lasted a total of 15 minutes face to face with the patient. Over 50% of the time was spent counseling and educating the patient about physical exam.     Liban VICTORIA, am scribing for and in the presence of, Dr. Dominguez.    asia VICTORIA personally performed the services described in this documentation, as scribed by Liban Mosqueda in my presence, and it is both accurate and complete.    MEDICATIONS:  Current Outpatient Prescriptions   Medication Sig Dispense Refill     amLODIPine (NORVASC) 10 MG tablet Take 1 tablet (10 mg total) by mouth daily. 90 tablet 3     cephalexin (KEFLEX) 500 MG capsule TAKE 1 CAPSULE(500 MG) BY MOUTH TWICE DAILY FOR 21 DAYS 42 capsule 0     metoprolol tartrate (LOPRESSOR)  25 MG tablet Take 0.5 tablets (12.5 mg total) by mouth 2 (two) times a day. 60 tablet 0     metoprolol tartrate (LOPRESSOR) 25 MG tablet TAKE 1 TABLET(25 MG) BY MOUTH TWICE DAILY 180 tablet 3     No current facility-administered medications for this visit.        Total data points:1

## 2021-06-14 NOTE — TELEPHONE ENCOUNTER
RN cannot approve Refill Request    RN can NOT refill this medication med is not covered by policy/route to provider.     Amlodipine filled 12/7/20    Trini Francis, [facility] Triage/Med Refill 1/5/2021    Requested Prescriptions   Pending Prescriptions Disp Refills     cephalexin (KEFLEX) 500 MG capsule [Pharmacy Med Name: CEPHALEXIN 500MG CAPSULES] 40 capsule 0     Sig: TAKE 1 CAPSULE(500 MG) BY MOUTH TWICE DAILY       There is no refill protocol information for this order        amLODIPine (NORVASC) 10 MG tablet [Pharmacy Med Name: AMLODIPINE BESYLATE 10MG TABLETS] 90 tablet 2     Sig: TAKE 1 TABLET(10 MG) BY MOUTH DAILY       Calcium-Channel Blockers Protocol Passed - 1/4/2021  1:03 PM        Passed - PCP or prescribing provider visit in past 12 months or next 3 months     Last office visit with prescriber/PCP: 12/10/2020 Bright Dominguez MD OR same dept: 12/10/2020 Bright Dominguez MD OR same specialty: 12/10/2020 Bright Dominguez MD  Last physical: 11/17/2017 Last MTM visit: Visit date not found   Next visit within 3 mo: Visit date not found  Next physical within 3 mo: Visit date not found  Prescriber OR PCP: Bright Dominguez MD  Last diagnosis associated with med order: 1. Chronic folliculitis  - cephalexin (KEFLEX) 500 MG capsule [Pharmacy Med Name: CEPHALEXIN 500MG CAPSULES]; TAKE 1 CAPSULE(500 MG) BY MOUTH TWICE DAILY  Dispense: 40 capsule; Refill: 0    2. Hypertensive emergency  - amLODIPine (NORVASC) 10 MG tablet [Pharmacy Med Name: AMLODIPINE BESYLATE 10MG TABLETS]; TAKE 1 TABLET(10 MG) BY MOUTH DAILY  Dispense: 90 tablet; Refill: 2    If protocol passes may refill for 12 months if within 3 months of last provider visit (or a total of 15 months).             Passed - Blood pressure filed in past 12 months     BP Readings from Last 1 Encounters:   12/10/20 (!) 140/106

## 2021-06-15 PROBLEM — I10 ESSENTIAL HYPERTENSION: Status: ACTIVE | Noted: 2017-02-09

## 2021-06-15 NOTE — TELEPHONE ENCOUNTER
RN cannot approve Refill Request    RN can NOT refill this medication Protocol failed and NO refill given. Last office visit: Visit date not found Last Physical: Visit date not found Last MTM visit: Visit date not found Last visit same specialty: 2/4/2021 Bright Dominguez MD.  Next visit within 3 mo: Visit date not found  Next physical within 3 mo: Visit date not found      Estefania Mahmood, [facility] Triage/Med Refill 3/4/2021    Requested Prescriptions   Pending Prescriptions Disp Refills     cephalexin (KEFLEX) 500 MG capsule [Pharmacy Med Name: CEPHALEXIN 500MG CAPSULES] 40 capsule 0     Sig: TAKE 1 CAPSULE(500 MG) BY MOUTH TWICE DAILY       There is no refill protocol information for this order

## 2021-06-15 NOTE — PROGRESS NOTES
ASSESSMENT and plan   1. Essential hypertension  Diastolic blood pressure is decreased below 100 however patient has not practicing sodium restriction recheck blood pressure within 4 weeks.  Patient has been taking metoprolol.  He has been working without any fatigue.  Creatinine was 1.6 in November we will recheck electrolytes today  - chlorthalidone (HYGROTEN) 25 MG tablet; Take 1 tablet (25 mg total) by mouth daily.  Dispense: 30 tablet; Refill: 3  - Basic Metabolic Panel    2. Morbid obesity (H)  Patient has lost more than 10 pounds by watching his weight also with the use of chlorthalidone we discussed that his dry weight needs to be below 290 pounds he will avoid heavy snacks and fried food.    3. Chronic folliculitis  Complains of an itchy scalp on the site of his folliculitis on the back of his neck will prescribe clobetasol side effects of medication discussed  - clobetasoL (TEMOVATE) 0.05 % ointment; Apply to affected area twice daily  Dispense: 30 g; Refill: 4      There are no Patient Instructions on file for this visit.    Orders Placed This Encounter   Procedures     Basic Metabolic Panel     Medications Discontinued During This Encounter   Medication Reason     chlorthalidone (HYGROTEN) 25 MG tablet Reorder       No follow-ups on file.    CHIEF COMPLAINT:  Chief Complaint   Patient presents with     Hypertension       HISTORY OF PRESENT ILLNESS:  Ray is a 40 y.o. male who is here for a follow-up he has elevated blood pressure morbid obesity and folliculitis.  He is return to work and has had no restrictions after his surgery.  He reports that he has not noticed any chest pain but has felt hot in his hands he is concerned that he may have a fever but when he checks it at home and here he notes that he has no elevated temperature has had a headache on 2 occasions but no dizziness weakness noted.  He has been taking his blood pressures faithfully notes no chest pain shortness of breath.  Is been  "trying lose weight but admits that he does not drink enough water although he stopped drinking Coke he also states that he still having salty snacks    REVIEW OF SYSTEMS:     Skin positive for warm hands which are sometimes sweaty denies his symptoms anywhere else on his body  CNS positive for headaches once every 2 weeks  10 point review of  All other systems are negative.    PFSH:  Works in a factory    TOBACCO USE:  Social History     Tobacco Use   Smoking Status Never Smoker   Smokeless Tobacco Never Used       VITALS:  Vitals:    02/04/21 0920 02/04/21 0942   BP: (!) 158/92 (!) 144/92   Pulse: 76    Resp: 18    Temp: 98  F (36.7  C)    TempSrc: Oral    Weight: (!) 303 lb 8 oz (137.7 kg)    Height: 5' 8\" (1.727 m)      Wt Readings from Last 3 Encounters:   02/04/21 (!) 303 lb 8 oz (137.7 kg)   12/10/20 (!) 314 lb 7 oz (142.6 kg)   11/10/20 (!) 304 lb 12 oz (138.2 kg)       PHYSICAL EXAM:  Interactive male sitting comfortably in exam room no acute distress  HEENT neck supple mucous members moist  Skin warm well perfused no active areas of erythema noted on the back of his neck at the site of his folliculitis.    CVS regular rate and rhythm no murmurs rubs gallops appreciated no pedal edema  Respiratory system clear to auscultation equal breath sounds no wheeze no crackles  CNS cranial nerves II to XII intact gait is normal reflexes are brisk in the lower extremities    DATA REVIEWED:  Additional History from Old Records Summarized (2): 0  Decision to Obtain Records (1): 0  Radiology Tests Summarized or Ordered (1): 0  Labs Reviewed or Ordered (1): 1  Medicine Test Summarized or Ordered (1): 0  Independent Review of EKG or X-RAY(2 each): 0    The visit lasted a total of 30 minutes     MEDICATIONS:  Current Outpatient Medications   Medication Sig Dispense Refill     acetaminophen (TYLENOL) 500 MG tablet Take 1-2 tablets (500-1,000 mg total) by mouth every 6 (six) hours as needed. Max 8 tablets per day  0     " amLODIPine (NORVASC) 10 MG tablet TAKE 1 TABLET(10 MG) BY MOUTH DAILY 90 tablet 2     calcium carb-mag oxide-zinc ox 334-134-5 mg Tab Take 1 tablet by mouth daily.       cephalexin (KEFLEX) 500 MG capsule TAKE 1 CAPSULE(500 MG) BY MOUTH TWICE DAILY 40 capsule 0     chlorthalidone (HYGROTEN) 25 MG tablet Take 1 tablet (25 mg total) by mouth daily. 30 tablet 3     clobetasoL (TEMOVATE) 0.05 % ointment Apply to affected area twice daily 30 g 4     metoprolol tartrate (LOPRESSOR) 25 MG tablet TAKE 1 TABLET BY MOUTH TWICE DAILY 180 tablet 3     NON FORMULARY Daily Beet supplement       oxyCODONE (ROXICODONE) 5 MG immediate release tablet Take 0.5-1 tablets (2.5-5 mg total) by mouth every 6 (six) hours as needed for pain. 5 tablet 0     No current facility-administered medications for this visit.      Bright ridley md

## 2021-06-16 PROBLEM — K37 APPENDICITIS: Status: ACTIVE | Noted: 2020-11-05

## 2021-06-16 PROBLEM — E66.01 MORBID OBESITY (H): Status: ACTIVE | Noted: 2018-10-26

## 2021-06-16 PROBLEM — N17.9 AKI (ACUTE KIDNEY INJURY) (H): Status: ACTIVE | Noted: 2020-11-06

## 2021-06-16 PROBLEM — L73.9 CHRONIC FOLLICULITIS: Status: ACTIVE | Noted: 2018-10-26

## 2021-06-16 PROBLEM — K35.30 ACUTE APPENDICITIS WITH LOCALIZED PERITONITIS, WITHOUT PERFORATION, ABSCESS, OR GANGRENE: Status: ACTIVE | Noted: 2020-11-05

## 2021-06-16 PROBLEM — D72.9 ABNORMAL WHITE BLOOD CELL (WBC) COUNT: Status: ACTIVE | Noted: 2020-11-06

## 2021-06-17 NOTE — TELEPHONE ENCOUNTER
RN cannot approve Refill Request    RN can NOT refill this medication med is not covered by policy/route to provider. Last office visit: 2/4/2021 Bright Dominguez MD Last Physical: 11/17/2017 Last MTM visit: Visit date not found Last visit same specialty: 2/4/2021 Bright Dominguez MD.  Next visit within 3 mo: Visit date not found  Next physical within 3 mo: Visit date not found      Lashell Muñoz, [facility] Triage/Med Refill 5/7/2021    Requested Prescriptions   Pending Prescriptions Disp Refills     cephalexin (KEFLEX) 500 MG capsule [Pharmacy Med Name: CEPHALEXIN 500MG CAPSULES] 40 capsule 0     Sig: TAKE 1 CAPSULE(500 MG) BY MOUTH TWICE DAILY       There is no refill protocol information for this order

## 2021-06-18 NOTE — PROGRESS NOTES
Patient arrived for BP check after being at dentist office, where BP was reported to be about 200/124 per patient.  It was taken with a wrist device.  With an arm cuff, lower number (diastolic) was 107.  Patient was not seen by dentist as a result.  After my second BP check, another nurse checked BP a third time.  Patient denies dizziness or feeling light headed and was discharged home.

## 2021-06-19 NOTE — LETTER
Letter by Bright Dominguez MD at      Author: Bright Dominguez MD Service: -- Author Type: --    Filed:  Encounter Date: 7/2/2019 Status: (Other)         Ray Grubbs  [address]  [place] [address]                     July 2, 2019    Dear Ray:    At the [facility], we care about you and your health. When diagnosed early, many diseases and illness can be treated and possibly prevented. That's why it is important to see your primary care provider regularly for routine examinations and to maintain your overall health.We are trying to contact you to ensure you receive the best level of care. Our records show that you are overdue for Hypertension Follow Up.  Please contact our office at [phone] to schedule an appointment.  If you are receiving care elsewhere, please contact us so that we can update our records.   Thank you for trusting us with your care.       If you have any questions or concerns, please don't hesitate to call.    Sincerely,    [facility] Staff      Electronically signed by Bright Dominguez MD

## 2021-06-19 NOTE — LETTER
Letter by Radha Hobbs MD at      Author: Radha Hobbs MD Service: -- Author Type: --    Filed:  Encounter Date: 10/1/2019 Status: Signed         October 1, 2019     Patient: Ray Grubbs   YOB: 1980   Date of Visit: 10/1/2019       To Whom it May Concern:    Ray Grubbs was seen in my clinic on 10/1/2019.    If you have any questions or concerns, please don't hesitate to call.    Sincerely,         Electronically signed by Radha Hobbs MD

## 2021-06-20 NOTE — LETTER
Letter by Bright Dominguez MD at      Author: Bright Dominguez MD Service: -- Author Type: --    Filed:  Encounter Date: 7/1/2020 Status: (Other)         July 1, 2020     Patient: Ray Grubbs   YOB: 1980   Date of Visit: 7/1/2020       To Whom It May Concern:    It is my medical opinion that Ray Grubbs may return to full duty immediately with no restrictions.  Please excuse his absence from work on 07/01/2020.    If you have any questions or concerns, please don't hesitate to call.    Sincerely,        Electronically signed by Bright Dominguez MD

## 2021-06-21 NOTE — PROGRESS NOTES
ASSESSMENT:  1. Hypertension  Patient's been taking his blood pressure medication faithfully however he needs to reduce his work hours so that he can exercise regularly and modify his diet he agreed  - metoprolol tartrate (LOPRESSOR) 25 MG tablet; TAKE 1 TABLET(25 MG) BY MOUTH TWICE DAILY  Dispense: 180 tablet; Refill: 3    2. Cellulitis of neck    Currently no active cellulitis noted however he has run out of his cephalexin is worked in the past I refilled the medication.    3. Morbid obesity (H)    Because of his work schedule he is gained 20 pounds since his last visit here he feels sluggish.  He is working 70 hours/week I have written a note for him that he can work more no more than 5 shifts per week.  He is unable to exercise because he sleeps for 8-9 hours and works for 12 hours/day.    4. Chronic folliculitis    Keflex given for suppression of the folliculitis.  He is not itching his scalp at this time.    5. Dermatitis    In the past when he has had a topical corticosteroid he is avoiding itching his scalp particularly on the left side of represcribed this for him today.      6.  Excessive ear wax bilateral    Lavage was done today.  TMs could not be visualized.  He should use an over-the-counter wax remover.      There are no Patient Instructions on file for this visit.    No orders of the defined types were placed in this encounter.    Medications Discontinued During This Encounter   Medication Reason     metoprolol tartrate (LOPRESSOR) 25 MG tablet Reorder       No Follow-up on file.    CHIEF COMPLAINT:  Chief Complaint   Patient presents with     Hypertension     Cerumen Impaction     Right ear       HISTORY OF PRESENT ILLNESS:  Ray is a 38 y.o. male     Who is here for a follow-up for his obesity and hypertension he also thinks that his ears are plugged.  He reports that he is been taking his blood pressure medication and taking the medication for his folliculitis which has been effective but he is  "run out of that.  He also reports that because he is working 6 days a week he thinks she is gained weight.  He is taken on vacation in the last year for 4 days.  He says he feels like this and has no time to exercise.    REVIEW OF SYSTEMS:     General positive for weight gain  Skin positive for rash on back of neck  HEENT positive for bilateral decreased hearing because of wax  According the patient 10 point review of  All other systems are negative.    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 [employer] no walking involved at work    TOBACCO USE:  History   Smoking Status     Never Smoker   Smokeless Tobacco     Never Used       VITALS:  Vitals:    10/26/18 0939   BP: 132/84   Pulse: (!) 59   Resp: 20   Temp: 98.1  F (36.7  C)   TempSrc: Oral   SpO2: 99%   Weight: (!) 309 lb 4 oz (140.3 kg)   Height: 5' 9\" (1.753 m)     Wt Readings from Last 3 Encounters:   10/26/18 (!) 309 lb 4 oz (140.3 kg)   11/17/17 (!) 287 lb (130.2 kg)   03/14/17 (!) 290 lb 4 oz (131.7 kg)       PHYSICAL EXAM:      Interactive morbidly obese -American male sitting in exam room in no acute distress  HEENT neck supple mucous membranes moist TMs not visualized because of wax deposition no sinus tenderness.  Respiratory system clear to auscultation equal breath sounds no wheeze no crackles  CVS regular rate and rhythm no murmurs rubs gallops appreciated  Skin warm well perfused no evidence of folliculitis noted on the right portion of the scalp there are multiple small noninflamed follicles present on the left portion of his neck.    DATA REVIEWED:  Additional History from Old Records Summarized (2): 0  Decision to Obtain Records (1): 0  Radiology Tests Summarized or Ordered (1): 0  Labs Reviewed or Ordered (1): 00  Medicine Test Summarized or Ordered (1): 0  Independent Review of EKG or X-RAY(2 each): 0    The visit lasted a total of 25 minutes face to face with the patient. Over 50% of the time was spent counseling and educating the " patient about       Multiple health conditions.    MEDICATIONS:  Current Outpatient Prescriptions   Medication Sig Dispense Refill     amLODIPine (NORVASC) 10 MG tablet Take 1 tablet (10 mg total) by mouth daily. 90 tablet 3     amLODIPine (NORVASC) 10 MG tablet Take 1 tablet (10 mg total) by mouth daily. 90 tablet 1     cephalexin (KEFLEX) 500 MG capsule TAKE 1 CAPSULE(500 MG) BY MOUTH TWICE DAILY FOR 21 DAYS 42 capsule 0     cephalexin (KEFLEX) 500 MG capsule Take 1 capsule (500 mg total) by mouth 2 (two) times a day. 40 capsule 3     clobetasol (TEMOVATE) 0.05 % Gel Apply to affected area twice daily 60 each 1     metoprolol tartrate (LOPRESSOR) 25 MG tablet Take 0.5 tablets (12.5 mg total) by mouth 2 (two) times a day. 60 tablet 0     metoprolol tartrate (LOPRESSOR) 25 MG tablet TAKE 1 TABLET(25 MG) BY MOUTH TWICE DAILY 180 tablet 0     metoprolol tartrate (LOPRESSOR) 25 MG tablet TAKE 1 TABLET(25 MG) BY MOUTH TWICE DAILY 180 tablet 3     No current facility-administered medications for this visit.    Please note that this clinical encounter uses voice recognition software, there may be typographical errors present

## 2021-06-21 NOTE — LETTER
Letter by Bright Dominguez MD at      Author: Bright Dominguez MD Service: -- Author Type: --    Filed:  Encounter Date: 5/4/2021 Status: (Other)         Ray Grubbs  [address]  [place] [address]                     May 4, 2021    Dear Ray:    At the [facility], we care about you and your health. When diagnosed early, many diseases and illness can be treated and possibly prevented. That's why it is important to see your primary care provider regularly for routine examinations and to maintain your overall health.We are trying to contact you to ensure you receive the best level of care. Our records show that you are overdue for a Hypertension follow up appointment.  Please contact our office at [phone] to schedule an appointment.  If you are receiving care elsewhere, please contact us so that we can update our records.   Thank you for trusting us with your care.     If you have any questions or concerns, please don't hesitate to call.    Sincerely,        Electronically signed by Bright Dominguez MD

## 2021-06-21 NOTE — LETTER
Letter by Rubi Ponce DO at      Author: Rubi Ponce DO Service: -- Author Type: --    Filed:  Encounter Date: 11/10/2020 Status: (Other)         November 10, 2020     Patient: Ray Grubbs   YOB: 1980   Date of Visit: 11/10/2020       To Whom it May Concern:    Ray Grubbs was seen in my clinic on 11/10/2020. Please excuse his absence.      If you have any questions or concerns, please don't hesitate to call.    Sincerely,         Electronically signed by Rubi Ponce DO

## 2021-06-25 NOTE — TELEPHONE ENCOUNTER
Refill Approved    Rx renewed per Medication Renewal Policy. Medication was last renewed on 2/4/21, last OV 2/4/21.    Estefania Mahmood, [facility] Triage/Med Refill 6/5/2021     Requested Prescriptions   Pending Prescriptions Disp Refills     chlorthalidone (HYGROTEN) 25 MG tablet [Pharmacy Med Name: CHLORTHALIDONE 25MG TABLETS] 30 tablet 3     Sig: TAKE 1 TABLET(25 MG) BY MOUTH DAILY       Diuretics/Combination Diuretics Refill Protocol  Passed - 6/4/2021  3:52 AM        Passed - Visit with PCP or prescribing provider visit in past 12 months     Last office visit with prescriber/PCP: 2/4/2021 Bright Dominguez MD OR same dept: 2/4/2021 Bright Dominguez MD OR same specialty: 2/4/2021 Bright Dominguez MD  Last physical: 11/17/2017 Last MTM visit: Visit date not found   Next visit within 3 mo: Visit date not found  Next physical within 3 mo: Visit date not found  Prescriber OR PCP: Bright Dominguez MD  Last diagnosis associated with med order: 1. Essential hypertension  - chlorthalidone (HYGROTEN) 25 MG tablet [Pharmacy Med Name: CHLORTHALIDONE 25MG TABLETS]; TAKE 1 TABLET(25 MG) BY MOUTH DAILY  Dispense: 30 tablet; Refill: 3    If protocol passes may refill for 12 months if within 3 months of last provider visit (or a total of 15 months).             Passed - Serum Potassium in past 12 months      Lab Results   Component Value Date    Potassium 3.9 02/04/2021             Passed - Serum Sodium in past 12 months      Lab Results   Component Value Date    Sodium 141 02/04/2021             Passed - Blood pressure on file in past 12 months     BP Readings from Last 1 Encounters:   02/04/21 (!) 144/92             Passed - Serum Creatinine in past 12 months      Creatinine   Date Value Ref Range Status   02/04/2021 1.65 (H) 0.70 - 1.30 mg/dL Final

## 2021-06-25 NOTE — TELEPHONE ENCOUNTER
RN cannot approve Refill Request    RN can NOT refill this medication med is not covered by policy/route to provider. Last office visit: 2/4/2021 Bright Dominguez MD Last Physical: 11/17/2017 Last MTM visit: Visit date not found Last visit same specialty: 2/4/2021 Bright Dominguez MD.  Next visit within 3 mo: Visit date not found  Next physical within 3 mo: Visit date not found      Keira Hayes, [facility] Triage/Med Refill 6/7/2021    Requested Prescriptions   Pending Prescriptions Disp Refills     cephalexin (KEFLEX) 500 MG capsule [Pharmacy Med Name: CEPHALEXIN 500MG CAPSULES] 40 capsule 0     Sig: TAKE 1 CAPSULE(500 MG) BY MOUTH TWICE DAILY       There is no refill protocol information for this order

## 2021-12-09 DIAGNOSIS — I16.1 HYPERTENSIVE EMERGENCY: ICD-10-CM

## 2021-12-09 DIAGNOSIS — Z76.0 ENCOUNTER FOR MEDICATION REFILL: Primary | ICD-10-CM

## 2021-12-09 RX ORDER — AMLODIPINE BESYLATE 10 MG/1
TABLET ORAL
Qty: 90 TABLET | Refills: 2 | Status: SHIPPED | OUTPATIENT
Start: 2021-12-09 | End: 2022-06-16

## 2021-12-09 NOTE — TELEPHONE ENCOUNTER
Medication Question or Refill    Who is calling: Patient    What medication are you calling about (include dose and sig)?: Amilodipine 10 mg tab take one tab daily    Controlled Substance Agreement on file: No    Who prescribed the medication?: PCP    Do you need a refill? Yes: pt is out of med    When did you use the medication last? yesterday    Patient offered an appointment? Yes: 1/18/21 at 2pm is the first available.  Pt has not been seen since 2/4/21.  Could appt be a phone or could pt be worked in sometime in the morning?      Do you have any questions or concerns?  No    Requested Pharmacy: [facility]    Okay to leave a detailed message?: Yes at Home number on file [phone] (home)    Thanks,     Call taken on 12/9/21 at 115 pm.  By Elli Maher CMA, CMT

## 2021-12-14 ENCOUNTER — TELEPHONE (OUTPATIENT)
Dept: FAMILY MEDICINE | Facility: CLINIC | Age: 41
End: 2021-12-14

## 2021-12-14 DIAGNOSIS — Z76.0 ENCOUNTER FOR MEDICATION REFILL: Primary | ICD-10-CM

## 2021-12-14 DIAGNOSIS — I10 HYPERTENSION: ICD-10-CM

## 2021-12-14 RX ORDER — METOPROLOL TARTRATE 25 MG/1
TABLET, FILM COATED ORAL
Qty: 180 TABLET | Refills: 3 | Status: SHIPPED | OUTPATIENT
Start: 2021-12-14 | End: 2022-12-02

## 2021-12-14 NOTE — TELEPHONE ENCOUNTER
Reason for Call:  Medication or medication refill:    Do you use a [facility]?  Name of the pharmacy and phone number for the current request:  [facility]    Name of the medication requested:   metoprolol tartrate 25 mg    Other request: pharmacy gave him #4      He is out now    Can we leave a detailed message on this number? YES    Phone number patient can be reached at: Cell number on file:    Telephone Information:   Mobile [phone]       Best Time: asap please     Call taken on 12/14/2021 at 12:22 PM by Samantha Valencia

## 2022-03-02 DIAGNOSIS — I10 ESSENTIAL HYPERTENSION: ICD-10-CM

## 2022-03-02 DIAGNOSIS — Z76.0 ENCOUNTER FOR MEDICATION REFILL: Primary | ICD-10-CM

## 2022-03-02 RX ORDER — CHLORTHALIDONE 25 MG/1
25 TABLET ORAL DAILY
Qty: 90 TABLET | Refills: 3 | Status: SHIPPED | OUTPATIENT
Start: 2022-03-02 | End: 2023-02-27

## 2022-03-02 NOTE — TELEPHONE ENCOUNTER
Patient Quality Outreach    Patient is due for the following:   Depression  -  PHQ-9 Needed and Depression follow-up visit, HTN  Immunizations  -  Influenza    NEXT STEPS:   Patient was scheduled for an appointment.    Type of outreach:    Phone, spoke to patient/parent. 4/5/22 at 740 am      Questions for provider review:    None     Call taken on 3/2/22 at 240 pm by Elli Maher. CMA.CMT

## 2022-06-16 ENCOUNTER — OFFICE VISIT (OUTPATIENT)
Dept: FAMILY MEDICINE | Facility: CLINIC | Age: 42
End: 2022-06-16
Payer: COMMERCIAL

## 2022-06-16 VITALS
HEIGHT: 69 IN | SYSTOLIC BLOOD PRESSURE: 144 MMHG | WEIGHT: 313 LBS | DIASTOLIC BLOOD PRESSURE: 98 MMHG | OXYGEN SATURATION: 97 % | BODY MASS INDEX: 46.36 KG/M2 | TEMPERATURE: 98.8 F | HEART RATE: 73 BPM | RESPIRATION RATE: 24 BRPM

## 2022-06-16 DIAGNOSIS — L73.9 CHRONIC FOLLICULITIS: ICD-10-CM

## 2022-06-16 DIAGNOSIS — Z12.5 SCREENING FOR PROSTATE CANCER: ICD-10-CM

## 2022-06-16 DIAGNOSIS — Z11.4 SCREENING FOR HIV (HUMAN IMMUNODEFICIENCY VIRUS): ICD-10-CM

## 2022-06-16 DIAGNOSIS — E66.813 CLASS 3 SEVERE OBESITY WITH BODY MASS INDEX (BMI) OF 45.0 TO 49.9 IN ADULT, UNSPECIFIED OBESITY TYPE, UNSPECIFIED WHETHER SERIOUS COMORBIDITY PRESENT (H): ICD-10-CM

## 2022-06-16 DIAGNOSIS — E66.01 CLASS 3 SEVERE OBESITY WITH BODY MASS INDEX (BMI) OF 45.0 TO 49.9 IN ADULT, UNSPECIFIED OBESITY TYPE, UNSPECIFIED WHETHER SERIOUS COMORBIDITY PRESENT (H): ICD-10-CM

## 2022-06-16 DIAGNOSIS — N18.31 CHRONIC KIDNEY DISEASE, STAGE 3A (H): ICD-10-CM

## 2022-06-16 DIAGNOSIS — I10 PRIMARY HYPERTENSION: Primary | ICD-10-CM

## 2022-06-16 DIAGNOSIS — Z11.59 NEED FOR HEPATITIS C SCREENING TEST: ICD-10-CM

## 2022-06-16 DIAGNOSIS — R53.83 FATIGUE, UNSPECIFIED TYPE: ICD-10-CM

## 2022-06-16 LAB
ALBUMIN SERPL-MCNC: 3.8 G/DL (ref 3.5–5)
ALP SERPL-CCNC: 37 U/L (ref 45–120)
ALT SERPL W P-5'-P-CCNC: 21 U/L (ref 0–45)
ANION GAP SERPL CALCULATED.3IONS-SCNC: 12 MMOL/L (ref 5–18)
AST SERPL W P-5'-P-CCNC: 21 U/L (ref 0–40)
BASOPHILS # BLD AUTO: 0 10E3/UL (ref 0–0.2)
BASOPHILS NFR BLD AUTO: 1 %
BILIRUB SERPL-MCNC: 0.8 MG/DL (ref 0–1)
BUN SERPL-MCNC: 21 MG/DL (ref 8–22)
CALCIUM SERPL-MCNC: 9.6 MG/DL (ref 8.5–10.5)
CHLORIDE BLD-SCNC: 100 MMOL/L (ref 98–107)
CHOLEST SERPL-MCNC: 177 MG/DL
CO2 SERPL-SCNC: 24 MMOL/L (ref 22–31)
CREAT SERPL-MCNC: 1.82 MG/DL (ref 0.7–1.3)
EOSINOPHIL # BLD AUTO: 0.3 10E3/UL (ref 0–0.7)
EOSINOPHIL NFR BLD AUTO: 5 %
ERYTHROCYTE [DISTWIDTH] IN BLOOD BY AUTOMATED COUNT: 12.4 % (ref 10–15)
FASTING STATUS PATIENT QL REPORTED: YES
GFR SERPL CREATININE-BSD FRML MDRD: 46 ML/MIN/1.73M2
GLUCOSE BLD-MCNC: 122 MG/DL (ref 70–125)
HBA1C MFR BLD: 5.4 % (ref 0–5.6)
HCT VFR BLD AUTO: 48.5 % (ref 40–53)
HDLC SERPL-MCNC: 26 MG/DL
HGB BLD-MCNC: 15.3 G/DL (ref 13.3–17.7)
IMM GRANULOCYTES # BLD: 0 10E3/UL
IMM GRANULOCYTES NFR BLD: 0 %
LDLC SERPL CALC-MCNC: 105 MG/DL
LYMPHOCYTES # BLD AUTO: 2.6 10E3/UL (ref 0.8–5.3)
LYMPHOCYTES NFR BLD AUTO: 42 %
MCH RBC QN AUTO: 28.5 PG (ref 26.5–33)
MCHC RBC AUTO-ENTMCNC: 31.5 G/DL (ref 31.5–36.5)
MCV RBC AUTO: 90 FL (ref 78–100)
MONOCYTES # BLD AUTO: 0.6 10E3/UL (ref 0–1.3)
MONOCYTES NFR BLD AUTO: 10 %
NEUTROPHILS # BLD AUTO: 2.7 10E3/UL (ref 1.6–8.3)
NEUTROPHILS NFR BLD AUTO: 43 %
PLATELET # BLD AUTO: 254 10E3/UL (ref 150–450)
POTASSIUM BLD-SCNC: 3.2 MMOL/L (ref 3.5–5)
PROT SERPL-MCNC: 7.9 G/DL (ref 6–8)
RBC # BLD AUTO: 5.37 10E6/UL (ref 4.4–5.9)
SODIUM SERPL-SCNC: 136 MMOL/L (ref 136–145)
TRIGL SERPL-MCNC: 230 MG/DL
WBC # BLD AUTO: 6.3 10E3/UL (ref 4–11)

## 2022-06-16 PROCEDURE — 80061 LIPID PANEL: CPT | Performed by: FAMILY MEDICINE

## 2022-06-16 PROCEDURE — 85025 COMPLETE CBC W/AUTO DIFF WBC: CPT | Performed by: FAMILY MEDICINE

## 2022-06-16 PROCEDURE — 36415 COLL VENOUS BLD VENIPUNCTURE: CPT | Performed by: FAMILY MEDICINE

## 2022-06-16 PROCEDURE — 99214 OFFICE O/P EST MOD 30 MIN: CPT | Performed by: FAMILY MEDICINE

## 2022-06-16 PROCEDURE — 80053 COMPREHEN METABOLIC PANEL: CPT | Performed by: FAMILY MEDICINE

## 2022-06-16 PROCEDURE — 83036 HEMOGLOBIN GLYCOSYLATED A1C: CPT | Performed by: FAMILY MEDICINE

## 2022-06-16 RX ORDER — CLOBETASOL PROPIONATE 0.5 MG/G
OINTMENT TOPICAL
Qty: 30 G | Refills: 1 | Status: SHIPPED | OUTPATIENT
Start: 2022-06-16 | End: 2024-09-16

## 2022-06-16 ASSESSMENT — ENCOUNTER SYMPTOMS: FATIGUE: 1

## 2022-06-16 NOTE — PROGRESS NOTES
"  Assessment & Plan     Fatigue  Based on his other symptoms last week of mild rhinorrhea, headache, diarrhea, suspect he had a mild viral infection causing his fatigue.  His fatigue has resolved after lasting about a week.  We are obtaining labs today as below.  If labs unremarkable and symptoms return recommend he return for reevaluation of fatigue.  Certainly sooner if any other symptoms such as chest pain, shortness of breath, lightheadedness.    Chronic folliculitis  Requests refill of clobetasol ointment.  Uses sparingly and infrequently.  He states that this consistently seems to help for the symptoms he gets.  - clobetasol (TEMOVATE) 0.05 % external ointment  Dispense: 30 g; Refill: 1    Primary hypertension  CKD  Blood pressure not at goal today.  He does not check at home.  Labs as below and return in about a week for nurse blood pressure check if confirmed to be elevated again plan to increase his metoprolol, and although he reports compliance, consider changing to once daily dosing metoprolol succinate.  - CBC with platelets and differential  - Comprehensive metabolic panel (BMP + Alb, Alk Phos, ALT, AST, Total. Bili, TP)  - Lipid panel  - Hemoglobin A1c  - Lipid panel    Class 3 severe obesity with body mass index (BMI) of 45.0 to 49.9 in adult, unspecified obesity type, unspecified whether serious comorbidity present (H)  - Hemoglobin A1c    Screening  Offered HIV screening, hepatitis C screening and discussed PSA screening and he declines these.               BMI:   Estimated body mass index is 46.73 kg/m  as calculated from the following:    Height as of this encounter: 1.743 m (5' 8.62\").    Weight as of this encounter: 142 kg (313 lb).           Return in about 1 week (around 6/23/2022) for nurse BP check.    Kasia Sorenson MD  [facility] DANIEL Bell is a 42 year old, presenting for the following health issues:  Fatigue (1 week )    Started feeling fatigued " "about one week ago. Wanted to come in and get labs to look for cause but not able to get into PCP for past week. In the meantime, started walking daily and this seems to have helped with symptoms. Now back to normal.     No SOB, no CP. Just tired, did not want to do anything. Had a cold around the same time - just mild rhinorrhea and headache. No ST or cough. One episode of diarrhea. No vomiting, no abd pain.     HTN - takes chlorthalidone daily and metoprolol bid, reports compliance. Is not on amlodipine - states it was stopped in past due to edema.     CKD - overdue for labs, has not been in for over a year, states he missed an appt    Worried about what caused his symptoms last week    Denies polyuria, polydipsia.  Denies personal or family history of heart disease or cancer.    Fatigue  Associated symptoms include fatigue.   History of Present Illness       Reason for visit:  Fatigue  Symptom onset:  3-7 days ago  Symptoms include:  Fatigue  Symptom intensity:  Moderate  Symptom progression:  Staying the same  Had these symptoms before:  Yes  Has tried/received treatment for these symptoms:  No  What makes it worse:  Moving  What makes it better:  Sleep              Review of Systems   Constitutional: Positive for fatigue.   Frequently gets some skin irritation on his lower scalp after haircuts, reports clobetasol ointment has been helpful for this in the past.  Uses sparingly and infrequently.  Would like refill.        Objective    Ht 1.743 m (5' 8.62\")   Wt 142 kg (313 lb)   BMI 46.73 kg/m    Body mass index is 46.73 kg/m .  Physical Exam   GENERAL: healthy, alert and no distress  EYES: Eyes grossly normal to inspection, PERRL and conjunctivae and sclerae normal  HENT: mouth without ulcers or lesions  NECK: no adenopathy  RESP: lungs clear to auscultation - no rales, rhonchi or wheezes  CV: regular rate and rhythm, normal S1 S2, no S3 or S4, no murmur, click or rub, no peripheral edema  ABDOMEN: soft, " nontender  MS: no gross musculoskeletal defects noted, no edema                    .  ..

## 2022-06-17 DIAGNOSIS — I10 PRIMARY HYPERTENSION: ICD-10-CM

## 2022-06-17 DIAGNOSIS — Z76.0 ENCOUNTER FOR MEDICATION REFILL: Primary | ICD-10-CM

## 2022-06-17 RX ORDER — LISINOPRIL 5 MG/1
5 TABLET ORAL DAILY
Qty: 30 TABLET | Refills: 0 | Status: SHIPPED | OUTPATIENT
Start: 2022-06-17 | End: 2022-06-17

## 2022-06-20 ENCOUNTER — TELEPHONE (OUTPATIENT)
Dept: FAMILY MEDICINE | Facility: CLINIC | Age: 42
End: 2022-06-20
Payer: COMMERCIAL

## 2022-06-20 RX ORDER — LISINOPRIL 5 MG/1
TABLET ORAL
Qty: 90 TABLET | Refills: 3 | Status: SHIPPED | OUTPATIENT
Start: 2022-06-20 | End: 2023-05-03

## 2022-06-20 NOTE — TELEPHONE ENCOUNTER
Reason for Call:  Other returning call    Detailed comments: Pt calling  Back.  Relayed message below by VF.  Pt understands.  Pt has appt with MA on 6/30 at 830 am to check BP.  Scheduled lab apt on that day at 845 am.  Pt has physical scheduled with PCP on 7/13.  Pt advised to eat more fruits and veggies to lower triglycerides and increase K+.  Thanks      Call taken on 6/20/2022 at 12:26 PM by Elli Maher CMA. TC

## 2022-06-20 NOTE — TELEPHONE ENCOUNTER
VISIT FACILITATOR left message x 1. Please review message thread below and advise the patient as indicated. Please schedule if necessary or indicated in message thread.     normal...

## 2022-06-20 NOTE — TELEPHONE ENCOUNTER
----- Message from Kasia Sorenson MD sent at 6/17/2022  2:27 PM CDT -----  Please call patient with results and/or recommendations below:  Potassium is low. This could be from your chlorthalidone medication. Increase potassium in diet (banana, melon, dried fruits, cooked spinach and broccoli, potatoes, sweet potatoes). I also recommend starting an additional medication called lisinopril (keep taking the chlorthalidone and metoprolol also). This is a medication that can increase the potassium and lower the blood pressure further, which will be good. It should also help protect the kidneys, but needs close follow up and recheck labs in about a week - please schedule BP visit w provider and can have labs at that time. Lastly, triglycerides are high and can be discussed at visit.

## 2022-06-30 ENCOUNTER — LAB (OUTPATIENT)
Dept: LAB | Facility: CLINIC | Age: 42
End: 2022-06-30

## 2022-06-30 ENCOUNTER — ALLIED HEALTH/NURSE VISIT (OUTPATIENT)
Dept: FAMILY MEDICINE | Facility: CLINIC | Age: 42
End: 2022-06-30
Payer: COMMERCIAL

## 2022-06-30 VITALS
TEMPERATURE: 98.5 F | DIASTOLIC BLOOD PRESSURE: 86 MMHG | HEART RATE: 64 BPM | OXYGEN SATURATION: 97 % | SYSTOLIC BLOOD PRESSURE: 134 MMHG

## 2022-06-30 DIAGNOSIS — I10 PRIMARY HYPERTENSION: Primary | ICD-10-CM

## 2022-06-30 DIAGNOSIS — E87.6 HYPOKALEMIA: ICD-10-CM

## 2022-06-30 DIAGNOSIS — E87.6 HYPOKALEMIA: Primary | ICD-10-CM

## 2022-06-30 LAB
ANION GAP SERPL CALCULATED.3IONS-SCNC: 8 MMOL/L (ref 7–15)
BUN SERPL-MCNC: 27.7 MG/DL (ref 6–20)
CALCIUM SERPL-MCNC: 9.4 MG/DL (ref 8.6–10)
CHLORIDE SERPL-SCNC: 99 MMOL/L (ref 98–107)
CREAT SERPL-MCNC: 1.8 MG/DL (ref 0.67–1.17)
DEPRECATED HCO3 PLAS-SCNC: 29 MMOL/L (ref 22–29)
GFR SERPL CREATININE-BSD FRML MDRD: 47 ML/MIN/1.73M2
GLUCOSE SERPL-MCNC: 119 MG/DL (ref 70–99)
POTASSIUM SERPL-SCNC: 3.7 MMOL/L (ref 3.4–5.3)
SODIUM SERPL-SCNC: 136 MMOL/L (ref 136–145)

## 2022-06-30 PROCEDURE — 36415 COLL VENOUS BLD VENIPUNCTURE: CPT

## 2022-06-30 PROCEDURE — 99207 PR NO CHARGE NURSE ONLY: CPT

## 2022-06-30 PROCEDURE — 80048 BASIC METABOLIC PNL TOTAL CA: CPT

## 2022-06-30 NOTE — PROGRESS NOTES
I met with Ray Grubbs at the request of Dr. Kasia Sorenson to recheck his blood pressure.  Blood pressure medications on the med list were reviewed with patient.    Patient has taken all medications as per usual regimen: Yes  Patient reports tolerating them without any issues or concerns: No    There were no vitals filed for this visit.    After 5 minutes, the patient's blood pressure was <140/90, the previous encounter was reviewed, recorded blood pressure below 140/90. Patient was discharged and the note will be sent to the provider for final review.

## 2022-06-30 NOTE — RESULT ENCOUNTER NOTE
Please inform patient that kidney function remains at the same level however his potassium is now normal

## 2022-07-13 ENCOUNTER — OFFICE VISIT (OUTPATIENT)
Dept: FAMILY MEDICINE | Facility: CLINIC | Age: 42
End: 2022-07-13
Payer: COMMERCIAL

## 2022-07-13 VITALS
SYSTOLIC BLOOD PRESSURE: 136 MMHG | BODY MASS INDEX: 46.36 KG/M2 | WEIGHT: 313 LBS | DIASTOLIC BLOOD PRESSURE: 84 MMHG | HEIGHT: 69 IN | HEART RATE: 63 BPM | TEMPERATURE: 98 F | RESPIRATION RATE: 16 BRPM | OXYGEN SATURATION: 98 %

## 2022-07-13 DIAGNOSIS — I10 BENIGN ESSENTIAL HYPERTENSION: ICD-10-CM

## 2022-07-13 DIAGNOSIS — E87.6 HYPOKALEMIA: ICD-10-CM

## 2022-07-13 DIAGNOSIS — Z11.59 NEED FOR HEPATITIS C SCREENING TEST: ICD-10-CM

## 2022-07-13 DIAGNOSIS — Z00.00 ROUTINE HISTORY AND PHYSICAL EXAMINATION OF ADULT: ICD-10-CM

## 2022-07-13 DIAGNOSIS — L91.8 SKIN TAG: ICD-10-CM

## 2022-07-13 DIAGNOSIS — H61.23 EXCESSIVE EAR WAX, BILATERAL: ICD-10-CM

## 2022-07-13 DIAGNOSIS — Z11.4 SCREENING FOR HIV (HUMAN IMMUNODEFICIENCY VIRUS): ICD-10-CM

## 2022-07-13 DIAGNOSIS — N18.31 CHRONIC KIDNEY DISEASE, STAGE 3A (H): Primary | ICD-10-CM

## 2022-07-13 LAB
ANION GAP SERPL CALCULATED.3IONS-SCNC: 18 MMOL/L (ref 7–15)
BUN SERPL-MCNC: 21 MG/DL (ref 6–20)
CALCIUM SERPL-MCNC: 10 MG/DL (ref 8.6–10)
CHLORIDE SERPL-SCNC: 100 MMOL/L (ref 98–107)
CREAT SERPL-MCNC: 1.43 MG/DL (ref 0.67–1.17)
CREAT UR-MCNC: 186 MG/DL
DEPRECATED HCO3 PLAS-SCNC: 20 MMOL/L (ref 22–29)
GFR SERPL CREATININE-BSD FRML MDRD: 62 ML/MIN/1.73M2
GLUCOSE SERPL-MCNC: 110 MG/DL (ref 70–99)
HCV AB SERPL QL IA: NONREACTIVE
HIV 1+2 AB+HIV1 P24 AG SERPL QL IA: NONREACTIVE
MICROALBUMIN UR-MCNC: 25.9 MG/L
MICROALBUMIN/CREAT UR: 13.92 MG/G CR (ref 0–17)
POTASSIUM SERPL-SCNC: 3.9 MMOL/L (ref 3.4–5.3)
SODIUM SERPL-SCNC: 138 MMOL/L (ref 136–145)

## 2022-07-13 PROCEDURE — 80048 BASIC METABOLIC PNL TOTAL CA: CPT | Performed by: FAMILY MEDICINE

## 2022-07-13 PROCEDURE — 87389 HIV-1 AG W/HIV-1&-2 AB AG IA: CPT | Performed by: FAMILY MEDICINE

## 2022-07-13 PROCEDURE — 82043 UR ALBUMIN QUANTITATIVE: CPT | Performed by: FAMILY MEDICINE

## 2022-07-13 PROCEDURE — 36415 COLL VENOUS BLD VENIPUNCTURE: CPT | Performed by: FAMILY MEDICINE

## 2022-07-13 PROCEDURE — 99396 PREV VISIT EST AGE 40-64: CPT | Performed by: FAMILY MEDICINE

## 2022-07-13 PROCEDURE — 86803 HEPATITIS C AB TEST: CPT | Performed by: FAMILY MEDICINE

## 2022-07-13 PROCEDURE — 99213 OFFICE O/P EST LOW 20 MIN: CPT | Mod: 25 | Performed by: FAMILY MEDICINE

## 2022-07-13 ASSESSMENT — ENCOUNTER SYMPTOMS
MYALGIAS: 0
FEVER: 0
EYE PAIN: 0
HEMATOCHEZIA: 0
PALPITATIONS: 0
FREQUENCY: 0
CONSTIPATION: 0
HEARTBURN: 0
JOINT SWELLING: 0
SORE THROAT: 0
NAUSEA: 0
DYSURIA: 0
HEADACHES: 0
DIARRHEA: 0
ABDOMINAL PAIN: 0
ARTHRALGIAS: 0
DIZZINESS: 0
CHILLS: 0
NERVOUS/ANXIOUS: 0
WEAKNESS: 0
SHORTNESS OF BREATH: 0
PARESTHESIAS: 0
HEMATURIA: 0
COUGH: 0

## 2022-07-13 NOTE — LETTER
July 14, 2022      Ray Grubbs  [address]  [place] [address]        Dear ,    We are writing to inform you of your test results.    Your potassium is now normal. Your kidney function has returned to normal.     Plan to recheck these labs in 6 months.     Resulted Orders   Albumin Random Urine Quantitative with Creat Ratio   Result Value Ref Range    Albumin Urine mg/L 25.9 mg/L      Comment:      The reference ranges have not been established in urine albumin. The results should be integrated into the clinical context for interpretation.    Albumin Urine mg/g Cr 13.92 0.00 - 17.00 mg/g Cr      Comment:      Microalbuminuria is defined as an albumin:creatinine ratio of 17 to 299 for males and 25 to 299 for females. A ratio of albumin:creatinine of 300 or higher is indicative of overt proteinuria.  Due to biologic variability, positive results should be confirmed by a second, first-morning random or 24-hour timed urine specimen. If there is discrepancy, a third specimen is recommended. When 2 out of 3 results are in the microalbuminuria range, this is evidence for incipient nephropathy and warrants increased efforts at glucose control, blood pressure control, and institution of therapy with an angiotensin-converting-enzyme (ACE) inhibitor (if the patient can tolerate it).      Creatinine Urine mg/dL 186.0 mg/dL      Comment:      The reference ranges have not been established in urine creatinine. The results should be integrated into the clinical context for interpretation.   HIV Antigen Antibody Combo   Result Value Ref Range    HIV Antigen Antibody Combo Nonreactive Nonreactive      Comment:      HIV-1 p24 Ag & HIV-1/HIV-2 Ab Not Detected   Hepatitis C Screen Reflex to HCV RNA Quant and Genotype   Result Value Ref Range    Hepatitis C Antibody Nonreactive Nonreactive    Narrative    Assay performance characteristics have not been established for newborns, infants, and children.    Basic metabolic panel  (Ca, Cl, CO2, Creat, Gluc, K, Na, BUN)   Result Value Ref Range    Creatinine 1.43 (H) 0.67 - 1.17 mg/dL    Sodium 138 136 - 145 mmol/L    Potassium 3.9 3.4 - 5.3 mmol/L    Urea Nitrogen 21.0 (H) 6.0 - 20.0 mg/dL    Chloride 100 98 - 107 mmol/L    Carbon Dioxide (CO2) 20 (L) 22 - 29 mmol/L    Anion Gap 18 (H) 7 - 15 mmol/L    Glucose 110 (H) 70 - 99 mg/dL    GFR Estimate 62 >60 mL/min/1.73m2      Comment:      Effective December 21, 2021 eGFRcr in adults is calculated using the 2021 CKD-EPI creatinine equation which includes age and gender (Tom et al., NEJM, DOI: 10.1056/SHRCkt6794260)    Calcium 10.0 8.6 - 10.0 mg/dL       If you have any questions or concerns, please call the clinic at the number listed above.       Sincerely,      Bright Dominguez MD

## 2022-07-13 NOTE — PROGRESS NOTES
SUBJECTIVE:   CC: Ray Grubbs is an 42 year old male who presents for preventative health visit.       Patient has been advised of split billing requirements and indicates understanding: Yes  Healthy Habits:     Getting at least 3 servings of Calcium per day:  Yes    Bi-annual eye exam:  NO    Dental care twice a year:  NO    Sleep apnea or symptoms of sleep apnea:  None    Diet:  Low salt and Breakfast skipped    Frequency of exercise:  2-3 days/week    Duration of exercise:  15-30 minutes    Taking medications regularly:  Yes    PHQ-2 Total Score: 0    Additional concerns today:  No      Patient is here for his annual physical.  He is fasting.  He was seen by Dr. Sorenson approximately 2 and half weeks ago he has been taking all his medication he started an exercise plan.  He reports that he has noticed some skin tags under both his right and left thigh that he like to have treated also notes that he is having some problems and he thinks his ears may be plugged.    Today's PHQ-2 Score:   PHQ-2 ( 1999 [employer]) 7/13/2022   Q1: Little interest or pleasure in doing things 0   Q2: Feeling down, depressed or hopeless 0   PHQ-2 Score 0   Q1: Little interest or pleasure in doing things Not at all   Q2: Feeling down, depressed or hopeless Not at all   PHQ-2 Score 0       Abuse: Current or Past(Physical, Sexual or Emotional)- No  Do you feel safe in your environment? Yes    Have you ever done Advance Care Planning? (For example, a Health Directive, POLST, or a discussion with a medical provider or your loved ones about your wishes): No, advance care planning information given to patient to review.  Patient plans to discuss their wishes with loved ones or provider.      Social History     Tobacco Use     Smoking status: Never Smoker     Smokeless tobacco: Never Used   Substance Use Topics     Alcohol use: Yes     Comment: Alcoholic Drinks/day: occasional     If you drink alcohol do you typically have >3 drinks per day or  ">7 drinks per week? No    Alcohol Use 7/13/2022   Prescreen: >3 drinks/day or >7 drinks/week? No   No flowsheet data found.    Last PSA: No results found for: PSA    Reviewed orders with patient. Reviewed health maintenance and updated orders accordingly - Yes  Lab work is in process  Labs reviewed in EPIC    Reviewed and updated as needed this visit by clinical staff   Tobacco  Allergies  Meds                Reviewed and updated as needed this visit by Provider                       Review of Systems  CONSTITUTIONAL: NEGATIVE for fever, chills, change in weight  INTEGUMENTARY/SKIN: Positive for skin tags under both eyes  EYES: NEGATIVE for vision changes or irritation  ENT: NEGATIVE for ear, mouth and throat problems  RESP: NEGATIVE for significant cough or SOB  CV: NEGATIVE for chest pain, palpitations or peripheral edema  GI: NEGATIVE for nausea, abdominal pain, heartburn, or change in bowel habits   male: negative for dysuria, hematuria, decreased urinary stream, erectile dysfunction, urethral discharge  MUSCULOSKELETAL: NEGATIVE for significant arthralgias or myalgia  NEURO: NEGATIVE for weakness, dizziness or paresthesias  PSYCHIATRIC: NEGATIVE for changes in mood or affect    OBJECTIVE:   /84   Pulse 63   Temp 98  F (36.7  C) (Oral)   Resp 16   Ht 1.743 m (5' 8.62\")   Wt 142 kg (313 lb)   SpO2 98%   BMI 46.74 kg/m      Physical Exam  GENERAL: healthy, alert and no distress  EYES: Eyes grossly normal to inspection, PERRL and conjunctivae and sclerae normal  HENT: ear canals and TM's normal, nose and mouth without ulcers or lesions  NECK: no adenopathy, no asymmetry, masses, or scars and thyroid normal to palpation  RESP: lungs clear to auscultation - no rales, rhonchi or wheezes  CV: regular rate and rhythm, normal S1 S2, no S3 or S4, no murmur, click or rub, no peripheral edema and peripheral pulses strong  ABDOMEN: soft, nontender, no hepatosplenomegaly, no masses and bowel sounds " normal  MS: no gross musculoskeletal defects noted, no edema  SKIN: no suspicious lesions or rashes, pedunculated skin tag noted under right eyelid small skin tag noted under left lower eyelid.  Pedunculated wart noted at lateral canthus of right eye approximately 2 cm from the lateral canthus measuring 0.1 by point 1 cm  NEURO: Normal strength and tone, mentation intact and speech normal  PSYCH: mentation appears normal, affect normal/bright        ASSESSMENT/PLAN:       ICD-10-CM    1. Chronic kidney disease, stage 3a (H) discussed last BMP with patient.  Repeating BMP today N18.31 Albumin Random Urine Quantitative with Creat Ratio     Basic metabolic panel  (Ca, Cl, CO2, Creat, Gluc, K, Na, BUN)     Albumin Random Urine Quantitative with Creat Ratio     Basic metabolic panel  (Ca, Cl, CO2, Creat, Gluc, K, Na, BUN)   2. Screening for HIV (human immunodeficiency virus) agrees for test Z11.4 HIV Antigen Antibody Combo     HIV Antigen Antibody Combo   3. Need for hepatitis C screening test agrees for test Z11.59 Hepatitis C Screen Reflex to HCV RNA Quant and Genotype     Hepatitis C Screen Reflex to HCV RNA Quant and Genotype   4. Routine history and physical examination of adult anticipatory guidance discussed Z00.00    5. Hypokalemia rechecking potassium last potassium 3.2 E87.6 Basic metabolic panel  (Ca, Cl, CO2, Creat, Gluc, K, Na, BUN)     Basic metabolic panel  (Ca, Cl, CO2, Creat, Gluc, K, Na, BUN)   6. Skin tag obtained verbal consent treated with cryotherapy x3 applications on 3 separate lesions L91.8    7. Excessive ear wax, bilateral treated by lavage asked patient not to use Q-tips H61.23    8. Benign essential hypertension blood pressure well controlled I10 Basic metabolic panel  (Ca, Cl, CO2, Creat, Gluc, K, Na, BUN)     Basic metabolic panel  (Ca, Cl, CO2, Creat, Gluc, K, Na, BUN)       Patient has been advised of split billing requirements and indicates understanding: Yes    COUNSELING:   Reviewed  "preventive health counseling, as reflected in patient instructions       Regular exercise       Healthy diet/nutrition       Vision screening    Estimated body mass index is 46.74 kg/m  as calculated from the following:    Height as of this encounter: 1.743 m (5' 8.62\").    Weight as of this encounter: 142 kg (313 lb).      He reports that he has never smoked. He has never used smokeless tobacco.      Counseling Resources:  ATP IV Guidelines  Pooled Cohorts Equation Calculator  FRAX Risk Assessment  ICSI Preventive Guidelines  Dietary Guidelines for Americans, 2010  USDA's MyPlate  ASA Prophylaxis  Lung CA Screening    Bright Dominguez MD  [facility]  "

## 2022-07-13 NOTE — PATIENT INSTRUCTIONS
Was a pleasure to see you today in the office.      Today we will be rechecking her potassium as we discussed other test today would include hepatitis C, and HIV test and I will check the protein in your urine.  Please continue to take your blood pressure medications      .  Any questions please contact me via [employer]

## 2022-07-14 NOTE — RESULT ENCOUNTER NOTE
Your potassium test from yesterday is now normal your kidney function has improved.  We will recheck this in 6 months.

## 2022-12-01 DIAGNOSIS — Z76.0 ENCOUNTER FOR MEDICATION REFILL: Primary | ICD-10-CM

## 2022-12-02 RX ORDER — METOPROLOL TARTRATE 25 MG/1
TABLET, FILM COATED ORAL
Qty: 180 TABLET | Refills: 3 | Status: SHIPPED | OUTPATIENT
Start: 2022-12-02 | End: 2024-02-15

## 2022-12-16 ENCOUNTER — TELEPHONE (OUTPATIENT)
Dept: FAMILY MEDICINE | Facility: CLINIC | Age: 42
End: 2022-12-16

## 2022-12-16 NOTE — TELEPHONE ENCOUNTER
S-(situation): started with a boots wearing. Ankle roll in the boots, causing it to be swollen. Bought new pair of shoes but not improving.   It got better but getting worse.  Patient experiencing pain, can bear weight on it but causes pain. Occurrence being going on for approximately three weeks.  Patient is uncertain if his foot is broken.      B-(background): n/a.    A-(assessment): Denied other symptoms. Would like a provider to call and tell him what is wrong with his foot.      R-(recommendations): [facility] walk in. Location closer to patient residence provided.  Advised patient to go to this urgent clinic for his foot to be evaluated. Location and hours provided.  Patient agreed.     MARY Tellez, RN  [facility]

## 2023-02-25 DIAGNOSIS — Z76.0 ENCOUNTER FOR MEDICATION REFILL: ICD-10-CM

## 2023-02-25 DIAGNOSIS — I10 ESSENTIAL HYPERTENSION: ICD-10-CM

## 2023-02-27 RX ORDER — CHLORTHALIDONE 25 MG/1
TABLET ORAL
Qty: 90 TABLET | Refills: 3 | Status: SHIPPED | OUTPATIENT
Start: 2023-02-27 | End: 2024-03-01

## 2023-02-27 NOTE — TELEPHONE ENCOUNTER
"Routing refill request to provider for review/approval because:  Labs out of range:  Creat    Last Written Prescription Date:  3/2/2022  Last Fill Quantity: 90,  # refills: 3   Last office visit provider:  7/13/2022     Requested Prescriptions   Pending Prescriptions Disp Refills     chlorthalidone (HYGROTON) 25 MG tablet [Pharmacy Med Name: CHLORTHALIDONE 25MG TABLETS] 90 tablet 3     Sig: TAKE 1 TABLET(25 MG) BY MOUTH DAILY       Diuretics (Including Combos) Protocol Failed - 2/27/2023 12:26 AM        Failed - Normal serum creatinine on file in past 12 months     Recent Labs   Lab Test 07/13/22  0851   CR 1.43*              Passed - Blood pressure under 140/90 in past 12 months     BP Readings from Last 3 Encounters:   07/13/22 136/84   06/30/22 134/86   06/16/22 (!) 144/98                 Passed - Recent (12 mo) or future (30 days) visit within the authorizing provider's specialty     Patient has had an office visit with the authorizing provider or a provider within the authorizing providers department within the previous 12 mos or has a future within next 30 days. See \"Patient Info\" tab in inbasket, or \"Choose Columns\" in Meds & Orders section of the refill encounter.              Passed - Medication is active on med list        Passed - Patient is age 18 or older        Passed - Normal serum potassium on file in past 12 months     Recent Labs   Lab Test 07/13/22  0851   POTASSIUM 3.9                    Passed - Normal serum sodium on file in past 12 months     Recent Labs   Lab Test 07/13/22  0851                      Jenny Johnson RN 02/27/23 12:29 AM  "

## 2023-03-30 ENCOUNTER — NURSE TRIAGE (OUTPATIENT)
Dept: NURSING | Facility: CLINIC | Age: 43
End: 2023-03-30
Payer: COMMERCIAL

## 2023-03-30 NOTE — TELEPHONE ENCOUNTER
"Pt states \"My feet keep on 'spraining'.\"  \"First time it happened, I was wearing boots.\"  Occurred approx 3 weeks ago.  \"Then got back to normal.\"  \"Then when wearing certain shoes, it happened again.\"  Had an x-ray outside of [place] \"a while ago\".  \"No findings.\"    L foot started hurting 3 days ago.  No known injury.  Shoe rubs against lateral ankle bone.  \"Looks bruised on that bone.\"  \"Hurts when standing on ball of foot.\"  \"Just the top of foot hurts.\"  Pt has been applying ice.  Better when wearing different shoes.    Changed shoes today.  Foot is \"okay right now\".  Advised pt of symptoms to monitor for (such as spreading of bruised discoloration, new swelling or pain, numbness/tingling or coldness of toes/foot).  Call back asap if any such sxs occur.  Pt verbalizes understanding.  Agrees to plan.    Elli WATKINS [facility] Nurse Advisor     Reason for Disposition    MILD pain (e.g., does not interfere with normal activities) and present > 7 days    Additional Information    Negative: Followed an ankle or foot injury    Negative: Toe pain is main symptom    Negative: Ankle pain is main symptom    Negative: Entire foot is cool or blue in comparison to other foot    Negative: Purple or black skin on foot or toe    Negative: Red area or streak and fever    Negative: Swollen foot and fever    Negative: Patient sounds very sick or weak to the triager    Negative: SEVERE pain (e.g., excruciating, unable to do any normal activities)    Negative: Looks like a boil, infected sore, deep ulcer, or other infected rash (spreading redness, pus)    Negative: Swollen foot  (Exceptions: Localized bump from bunions, calluses, insect bite, sting.)    Negative: Weakness (i.e., loss of strength) of new-onset in foot or toes (Exceptions: Not truly weak, foot feels weak because of pain; weakness present > 2 weeks.)    Negative: Numbness (i.e., loss of sensation) in foot or toes (Exception: Just tingling; numbness present > 2 " weeks.)    Negative: MODERATE pain (e.g., interferes with normal activities, limping) and present > 3 days    Negative: Weakness or numbness in foot or toes and present > 2 weeks    Negative: Tingling in feet and new or increased    Negative: Pain in the big toe joint    Negative: Patient wants to be seen    Protocols used: FOOT PAIN-A-OH

## 2023-03-31 ENCOUNTER — TELEPHONE (OUTPATIENT)
Dept: FAMILY MEDICINE | Facility: CLINIC | Age: 43
End: 2023-03-31
Payer: COMMERCIAL

## 2023-03-31 NOTE — TELEPHONE ENCOUNTER
Pt stopped in today asking for an x-ray.  I talked with Dr. Acosta, who is covering for [facility], and she said she wouldn't do an x-ray without seeing him.  I told him [facility] should go to [facility].    He has an appt set up with  on 4/12, but would like to be seen sooner.  Can [facility] squeeze him in before 4/12?

## 2023-03-31 NOTE — TELEPHONE ENCOUNTER
Called pt and first open is 4/7/23 at 140 pm.  If pt calls back, please schedule him at that time.  Thanks

## 2023-04-01 ENCOUNTER — HOSPITAL ENCOUNTER (OUTPATIENT)
Dept: GENERAL RADIOLOGY | Facility: HOSPITAL | Age: 43
Discharge: HOME | End: 2023-04-01
Attending: FAMILY MEDICINE | Admitting: FAMILY MEDICINE
Payer: COMMERCIAL

## 2023-04-01 ENCOUNTER — NURSE TRIAGE (OUTPATIENT)
Dept: NURSING | Facility: CLINIC | Age: 43
End: 2023-04-01

## 2023-04-01 ENCOUNTER — OFFICE VISIT (OUTPATIENT)
Dept: FAMILY MEDICINE | Facility: CLINIC | Age: 43
End: 2023-04-01
Payer: COMMERCIAL

## 2023-04-01 VITALS
SYSTOLIC BLOOD PRESSURE: 137 MMHG | TEMPERATURE: 97.6 F | DIASTOLIC BLOOD PRESSURE: 86 MMHG | HEART RATE: 105 BPM | WEIGHT: 304 LBS | BODY MASS INDEX: 45.39 KG/M2 | OXYGEN SATURATION: 98 %

## 2023-04-01 DIAGNOSIS — M79.672 LEFT FOOT PAIN: ICD-10-CM

## 2023-04-01 DIAGNOSIS — M77.52 TENDINITIS OF LEFT FOOT: Primary | ICD-10-CM

## 2023-04-01 DIAGNOSIS — M77.52 TENDINITIS OF LEFT FOOT: ICD-10-CM

## 2023-04-01 PROCEDURE — 73630 X-RAY EXAM OF FOOT: CPT | Mod: LT

## 2023-04-01 PROCEDURE — 99213 OFFICE O/P EST LOW 20 MIN: CPT | Performed by: FAMILY MEDICINE

## 2023-04-01 RX ORDER — NAPROXEN 500 MG/1
500 TABLET ORAL 2 TIMES DAILY WITH MEALS
Qty: 30 TABLET | Refills: 1 | Status: SHIPPED | OUTPATIENT
Start: 2023-04-01 | End: 2024-09-16

## 2023-04-01 RX ORDER — NAPROXEN 500 MG/1
500 TABLET ORAL 2 TIMES DAILY WITH MEALS
Qty: 30 TABLET | Refills: 1 | Status: SHIPPED | OUTPATIENT
Start: 2023-04-01 | End: 2023-04-01

## 2023-04-01 NOTE — PROGRESS NOTES
Assessment:       Tendinitis of left foot  - naproxen (NAPROSYN) 500 MG tablet  Dispense: 30 tablet; Refill: 1    Left foot pain  - XR Foot Left G/E 3 Views         Plan:     Symptoms consistent with a tendinitis of the left foot.  X-ray ordered and personally reviewed by myself showing no evidence of fracture that I can appreciate.  We will treat with naproxen twice daily.  Recommend rest, ice.  Follow-up if symptoms getting worse or not improving in 2 weeks.    MEDICATIONS:   Orders Placed This Encounter   Medications     naproxen (NAPROSYN) 500 MG tablet     Sig: Take 1 tablet (500 mg) by mouth 2 times daily (with meals) for 15 days     Dispense:  30 tablet     Refill:  1         Subjective:       43 year old male presents for evaluation of 2-week history of left foot pain that started after he started using some new steel toed boots.  Pain is worse when he flexes his great toe on the left but is also having some left lateral foot pain on the bottom of his foot as well.  He has tried using an over-the-counter cream that he is not sure the name of as well as Epsom salt which has not been helpful.  He denies any other trauma.  No significant swelling, redness, or warmth.  No fevers.    Patient Active Problem List   Diagnosis     Essential hypertension     Hypertensive retinopathy of left eye     Acute renal failure, unspecified acute renal failure type (H)     Mastoiditis of right side     Chronic folliculitis     Morbid obesity (H)     Appendicitis     Acute appendicitis with localized peritonitis, without perforation, abscess, or gangrene     DIEGO (acute kidney injury) (H)     Abnormal white blood cell (WBC) count     Chronic kidney disease, stage 3a (H)       Past Medical History:   Diagnosis Date     CKD (chronic kidney disease)      Hypertension      Obesity        Past Surgical History:   Procedure Laterality Date     NO PAST SURGERIES       MS LAP,APPENDECTOMY N/A 11/5/2020    Procedure: APPENDECTOMY,  LAPAROSCOPIC;  Surgeon: Catalino Mireles MD;  Location: [facility] OR;  Service: General       Current Outpatient Medications   Medication     calcium carb-mag oxide-zinc ox 334-134-5 mg Tab     chlorthalidone (HYGROTON) 25 MG tablet     clobetasol (TEMOVATE) 0.05 % external ointment     metoprolol tartrate (LOPRESSOR) 25 MG tablet     NON FORMULARY     lisinopril (ZESTRIL) 5 MG tablet     No current facility-administered medications for this visit.       No Known Allergies    Family History   Problem Relation Age of Onset     Cerebrovascular Disease Father      Hypertension Father      Diabetes Paternal Uncle      Hypertension Paternal Uncle        Social History     Socioeconomic History     Marital status: Patient Declined     Spouse name: None     Number of children: None     Years of education: None     Highest education level: None   Tobacco Use     Smoking status: Never     Smokeless tobacco: Never   Substance and Sexual Activity     Alcohol use: Yes     Comment: Alcoholic Drinks/day: occasional     Drug use: No         Review of Systems  Pertinent items are noted in HPI.      Objective:     /86   Pulse 105   Temp 97.6  F (36.4  C) (Tympanic)   Wt 137.9 kg (304 lb)   SpO2 98%   BMI 45.39 kg/m       General appearance: alert, appears stated age and cooperative  Extremities: Patient noted to have tenderness at the base of his left great toe when he dorsiflexes his toe.  There is no swelling, tenderness, or redness at the base of his toe however.  There is no warmth.  He has some tenderness at the base of the fifth metatarsal as well and of his heel on the left.  No other ankle swelling.  No tenderness with flexion and extension of his heel.  Pain is worse with weightbearing.      Results for orders placed or performed during the hospital encounter of 04/01/23   XR Foot Left G/E 3 Views     Status: None    Narrative    EXAM: XR FOOT LEFT G/E 3 VIEWS  LOCATION: [facility]  HOSPITAL  DATE/TIME: 4/1/2023 10:38 AM    INDICATION:  Left foot pain  COMPARISON: None.      Impression    IMPRESSION: No fractures are evident. Mild degenerative changes in the first MTP joint. Small plantar and Achilles calcaneal spurs.       This note has been dictated using voice recognition software. Any grammatical or context distortions are unintentional and inherent to the software

## 2023-04-01 NOTE — PATIENT INSTRUCTIONS
Your x-ray is normal.  There are no fractures.  I think this is likely a tendinitis or an inflammation of the tendons in the foot.  I would recommend naproxen twice daily with food.  This should help with the inflammation and tendinitis.  I have sent a prescription to your pharmacy.  Ice may also be helpful.  Follow-up if symptoms are getting worse or not improving over the next 2 weeks.

## 2023-04-01 NOTE — TELEPHONE ENCOUNTER
Patient seen at Urgent Care this morning, Rx for Naproxen. [facility] store that was sent to has no power so need Rx routed to different [facility].  Lashell Peterson RN on 4/1/2023 at 12:46 PM    Reason for Disposition    [1] Prescription not at pharmacy AND [2] was prescribed by PCP recently (Exception: triager has access to EMR and prescription is recorded there. Go to Home Care and confirm for pharmacy.)    Additional Information    Negative: Drug overdose and triager unable to answer question    Negative: Caller requesting a renewal or refill of a medicine patient is currently taking    Negative: Caller requesting information unrelated to medicine    Negative: Caller requesting information about COVID-19 Vaccine    Negative: Caller requesting information about Emergency Contraception    Negative: Caller requesting information about Combined Birth Control Pills    Negative: Caller requesting information about Progestin Birth Control Pills    Negative: Caller requesting information about Post-Op pain or medicines    Negative: Caller requesting a prescription antibiotic (such as Penicillin) for Strep throat and has a positive culture result    Negative: Caller requesting a prescription anti-viral med (such as Tamiflu) and has influenza (flu) symptoms    Negative: Immunization reaction suspected    Negative: Rash while taking a medicine or within 3 days of stopping it    Negative: [1] Asthma and [2] having symptoms of asthma (cough, wheezing, etc.)    Negative: [1] Symptom of illness (e.g., headache, abdominal pain, earache, vomiting) AND [2] more than mild    Negative: Breastfeeding questions about mother's medicines and diet    Negative: MORE THAN A DOUBLE DOSE of a prescription or over-the-counter (OTC) drug    Negative: [1] DOUBLE DOSE (an extra dose or lesser amount) of prescription drug AND [2] any symptoms (e.g., dizziness, nausea, pain, sleepiness)    Negative: [1] DOUBLE DOSE (an extra dose or lesser amount) of  over-the-counter (OTC) drug AND [2] any symptoms (e.g., dizziness, nausea, pain, sleepiness)    Negative: Took another person's prescription drug    Negative: Diabetes drug error or overdose (e.g., took wrong type of insulin or took extra dose)    Negative: [1] DOUBLE DOSE (an extra dose or lesser amount) of prescription drug AND [2] NO symptoms (Exception: a double dose of antibiotics)    Protocols used: MEDICATION QUESTION CALL-A-AH

## 2023-04-17 ENCOUNTER — OFFICE VISIT (OUTPATIENT)
Dept: FAMILY MEDICINE | Facility: CLINIC | Age: 43
End: 2023-04-17
Payer: COMMERCIAL

## 2023-04-17 VITALS
DIASTOLIC BLOOD PRESSURE: 94 MMHG | TEMPERATURE: 98.2 F | RESPIRATION RATE: 16 BRPM | WEIGHT: 306 LBS | HEART RATE: 64 BPM | HEIGHT: 69 IN | SYSTOLIC BLOOD PRESSURE: 142 MMHG | BODY MASS INDEX: 45.32 KG/M2 | OXYGEN SATURATION: 99 %

## 2023-04-17 DIAGNOSIS — M79.672 LEFT FOOT PAIN: Primary | ICD-10-CM

## 2023-04-17 DIAGNOSIS — Z13.1 SCREENING FOR DIABETES MELLITUS: ICD-10-CM

## 2023-04-17 DIAGNOSIS — Z12.11 SCREEN FOR COLON CANCER: ICD-10-CM

## 2023-04-17 DIAGNOSIS — E78.5 HYPERLIPIDEMIA LDL GOAL <130: ICD-10-CM

## 2023-04-17 DIAGNOSIS — N18.31 CHRONIC KIDNEY DISEASE, STAGE 3A (H): ICD-10-CM

## 2023-04-17 DIAGNOSIS — I10 PRIMARY HYPERTENSION: ICD-10-CM

## 2023-04-17 LAB
ALBUMIN SERPL BCG-MCNC: 4.5 G/DL (ref 3.5–5.2)
ALP SERPL-CCNC: 45 U/L (ref 40–129)
ALT SERPL W P-5'-P-CCNC: 15 U/L (ref 10–50)
ANION GAP SERPL CALCULATED.3IONS-SCNC: 16 MMOL/L (ref 7–15)
AST SERPL W P-5'-P-CCNC: 29 U/L (ref 10–50)
BILIRUB SERPL-MCNC: 0.5 MG/DL
BUN SERPL-MCNC: 22.7 MG/DL (ref 6–20)
CALCIUM SERPL-MCNC: 10.3 MG/DL (ref 8.6–10)
CHLORIDE SERPL-SCNC: 99 MMOL/L (ref 98–107)
CHOLEST SERPL-MCNC: 228 MG/DL
CREAT SERPL-MCNC: 1.49 MG/DL (ref 0.67–1.17)
DEPRECATED HCO3 PLAS-SCNC: 24 MMOL/L (ref 22–29)
GFR SERPL CREATININE-BSD FRML MDRD: 59 ML/MIN/1.73M2
GLUCOSE SERPL-MCNC: 104 MG/DL (ref 70–99)
HBA1C MFR BLD: 5.6 % (ref 0–5.6)
HDLC SERPL-MCNC: 28 MG/DL
HGB BLD-MCNC: 15.9 G/DL (ref 13.3–17.7)
LDLC SERPL CALC-MCNC: 161 MG/DL
NONHDLC SERPL-MCNC: 200 MG/DL
POTASSIUM SERPL-SCNC: 3.5 MMOL/L (ref 3.4–5.3)
PROT SERPL-MCNC: 8.5 G/DL (ref 6.4–8.3)
SODIUM SERPL-SCNC: 139 MMOL/L (ref 136–145)
TRIGL SERPL-MCNC: 196 MG/DL

## 2023-04-17 PROCEDURE — 36415 COLL VENOUS BLD VENIPUNCTURE: CPT | Performed by: FAMILY MEDICINE

## 2023-04-17 PROCEDURE — 80053 COMPREHEN METABOLIC PANEL: CPT | Performed by: FAMILY MEDICINE

## 2023-04-17 PROCEDURE — 80061 LIPID PANEL: CPT | Performed by: FAMILY MEDICINE

## 2023-04-17 PROCEDURE — 99214 OFFICE O/P EST MOD 30 MIN: CPT | Performed by: FAMILY MEDICINE

## 2023-04-17 PROCEDURE — 83036 HEMOGLOBIN GLYCOSYLATED A1C: CPT | Performed by: FAMILY MEDICINE

## 2023-04-17 PROCEDURE — 85018 HEMOGLOBIN: CPT | Performed by: FAMILY MEDICINE

## 2023-04-17 NOTE — PROGRESS NOTES
"  Assessment & Plan     Screen for colon cancer  Declines colon cancer screening referral today    HTN  BP slightly elevated, recommend follow up w PCP for annual physical and adjustment of BP med if remains elevated; consider ACEI/ARB given CKD    Chronic kidney disease, stage 3a (H)  - Hemoglobin  - Comprehensive metabolic panel (BMP + Alb, Alk Phos, ALT, AST, Total. Bili, TP)    Hyperlipidemia LDL goal <130  - Lipid panel reflex to direct LDL Fasting    Screening for diabetes mellitus  - Hemoglobin A1c    Left foot pain  Recommend podiatry, likely would benefit from orthotic shoe inserts  - Orthopedic  Referral               BMI:   Estimated body mass index is 45.42 kg/m  as calculated from the following:    Height as of this encounter: 1.748 m (5' 8.82\").    Weight as of this encounter: 138.8 kg (306 lb).           Kasia Sorenson MD  [facility] DANIEL Bell is a 43 year old, presenting for the following health issues:  Musculoskeletal Problem (One after another foot pain for several months. Balance problem)        4/17/2023     8:29 AM   Additional Questions   Roomed by Clemencia GAYLE     Left foot/ankle pain - [facility] 4/1 for 2 weeks of pain. Started after wearing new work boots. Rolled ankle. Left side got better,then right started hurting. Now right is better and left hurts again. Pain is medial and lateral ankle and 1st MTP area. No swelling or redness. Rx for naprosyn from [facility], which helped but he only took x 3 days due to CKD.     Some times toes are tingling or slightly numb    Hyperlipidemia - fasting today for labs    HTN - reports med compliance, chlorthalidone and metoprolol tartrate, BP slightly elevated today    CKD - Patient uncertain of cause. Not on an ace/arb - it appears lisinopril was prescribed last June, patient not taking and does not recall why            Review of Systems         Objective    BP (!) 142/94 (BP Location: Right arm)   Pulse 64   Temp 98.2 " " F (36.8  C) (Oral)   Resp 16   Ht 1.748 m (5' 8.82\")   Wt 138.8 kg (306 lb)   SpO2 99%   BMI 45.42 kg/m    Body mass index is 45.42 kg/m .  Physical Exam   MSK: Left foot warm and well perfused. No foot or ankle swelling. Easily palpable DP pulse. No tenderness over achilles or over plantar fascia. No skin lesions.                       "

## 2023-04-20 ENCOUNTER — OFFICE VISIT (OUTPATIENT)
Dept: PODIATRY | Facility: CLINIC | Age: 43
End: 2023-04-20
Attending: FAMILY MEDICINE
Payer: COMMERCIAL

## 2023-04-20 VITALS — WEIGHT: 306 LBS | HEIGHT: 69 IN | HEART RATE: 61 BPM | BODY MASS INDEX: 45.32 KG/M2 | OXYGEN SATURATION: 98 %

## 2023-04-20 DIAGNOSIS — M10.9 GOUTY ARTHROPATHY: ICD-10-CM

## 2023-04-20 DIAGNOSIS — M77.32 CALCANEAL SPUR, LEFT: ICD-10-CM

## 2023-04-20 DIAGNOSIS — M21.6X2 PRONATION DEFORMITY OF BOTH FEET: Primary | ICD-10-CM

## 2023-04-20 DIAGNOSIS — M21.6X1 PRONATION DEFORMITY OF BOTH FEET: Primary | ICD-10-CM

## 2023-04-20 PROCEDURE — 99203 OFFICE O/P NEW LOW 30 MIN: CPT | Performed by: PODIATRIST

## 2023-04-20 ASSESSMENT — PAIN SCALES - GENERAL: PAINLEVEL: NO PAIN (0)

## 2023-04-20 NOTE — PATIENT INSTRUCTIONS
What are Prescription Custom Orthotics?  Custom orthotics are specially-made devices designed to support and comfort your feet. Prescription orthotics are crafted for you and no one else. They match the contours of your feet precisely and are designed for the way you move. Orthotics are only manufactured after a podiatrist has conducted a complete evaluation of your feet, ankles, and legs, so the orthotic can accommodate your unique foot structure and pathology.  Prescription orthotics are divided into two categories:  Functional orthotics are designed to control abnormal motion. They may be used to treat foot pain caused by abnormal motion; they can also be used to treat injuries such as shin splints or tendinitis. Functional orthotics are usually crafted of a semi-rigid material such as plastic or graphite.  Accommodative orthotics are softer and meant to provide additional cushioning and support. They can be used to treat diabetic foot ulcers, painful calluses on the bottom of the foot, and other uncomfortable conditions.  Podiatrists use orthotics to treat foot problems such as plantar fasciitis, bursitis, tendinitis, diabetic foot ulcers, and foot, ankle, and heel pain. Clinical research studies have shown that podiatrist-prescribed foot orthotics decrease foot pain and improve function.  Orthotics typically cost more than shoe inserts purchased in a retail store, but the additional cost is usually well worth it. Unlike shoe inserts, orthotics are molded to fit each individual foot, so you can be sure that your orthotics fit and do what they're supposed to do. Prescription orthotics are also made of top-notch materials and last many years when cared for properly. Insurance often helps pay for prescription orthotics.  What are Shoe Inserts?   You've seen them at the grocery store and at the mall. You've probably even seen them on TV and online. Shoe inserts are any kind of non-prescription foot support designed  to be worn inside a shoe. Pre-packaged, mass produced, arch supports are shoe inserts. So are the  custom-made  insoles and foot supports that you can order online or at retail stores. Unless the device has been prescribed by a doctor and crafted for your specific foot, it's a shoe insert, not a custom orthotic device--despite what the ads might say.  Shoe inserts can be very helpful for a variety of foot ailments, including flat arches and foot and leg pain. They can cushion your feet, provide comfort, and support your arches, but they can't correct biomechanical foot problems or cure long-standing foot issues.  The most common types of shoe inserts are:  Arch supports: Some people have high arches. Others have low arches or flat feet. Arch supports generally have a  bumped-up  appearance and are designed to support the foot's natural arch.   Insoles: Insoles slip into your shoe to provide extra cushioning and support. Insoles are often made of gel, foam, or plastic.   Heel liners: Heel liners, sometimes called heel pads or heel cups, provide extra cushioning in the heel region. They may be especially useful for patients who have foot pain caused by age-related thinning of the heels' natural fat pads.   Foot cushions: Do your shoes rub against your heel or your toes? Foot cushions come in many different shapes and sizes and can be used as a barrier between you and your shoe.  Choosing an Over-the-Counter Shoe Insert  Selecting a shoe insert from the wide variety of devices on the market can be overwhelming. Here are some podiatrist-tested tips to help you find the insert that best meets your needs:  Consider your health. Do you have diabetes? Problems with circulation? An over-the-counter insert may not be your best bet. Diabetes and poor circulation increase your risk of foot ulcers and infections, so schedule an appointment with a podiatrist. He or she can help you select a solution that won't cause additional  health problems.   Think about the purpose. Are you planning to run a marathon, or do you just need a little arch support in your work shoes? Look for a product that fits your planned level of activity.   Bring your shoes. For the insert to be effective, it has to fit into your shoes. So bring your sneakers, dress shoes, or work boots--whatever you plan to wear with your insert. Look for an insert that will fit the contours of your shoe.   Try them on. If all possible, slip the insert into your shoe and try it out. Walk around a little. How does it feel? Don't assume that feelings of pressure will go away with continued wear. (If you can't try the inserts at the store, ask about the store's return policy and hold on to your receipt.)    Please call one of the [facility] locations below to schedule an appointment. If you received a prescription please bring it with you to your appointment. Some locations are limited to what they carry.    Office Locations    [facility]  [address] [place] [address]  Home Medical Equipment, [address]   Phone: [phone]   Orthotics and Prosthetics, [address]   Phone: [phone]    [facility]   Home Medical Equipment   [address] [address]  Phone :[phone]  Orthotics and Prosthetics   [address], [facility] [address]  Phone:[phone]          [facility]  [address]   [place] [address]   Phone: [phone]    [facility] Professional [facility]ld[facility]  [address]  [place] [address]  Phone: [phone]    [facility]   [address]  [place] [address]  Phone: [phone]    [facility]  [address]  [place] [address]  Phone:  [phone]    [facility]  [address]  [place] [address]  Phone: [phone]    [place]   [address] [place] [address]   Phone: [phone]    WEARING YOUR CUSTOM FOOT ORTHOTICS   Most insurance plans cover one pair of orthotics per year. You must check with your   insurance plan to see what your payment responsibility will be. Please call your   insurance company by calling the number on the back of your insurance card.   Orthotic's are non-refundable and non-returnable.   Orthotics are made of various designs. Some orthotics are covered with material that extends beyond your toes. If your orthotic is of this design, you will likely need to trim the toe end to get a proper fit. The insole from your shoe can be used as a template. Simply overlay the shoe insert on top of the custom orthotic. Align the heel end while tracing the length of the insert onto the custom orthotic. Use a large scissor to trim the toe end until you get a proper fit in the shoe.   The orthotic needs to be pushed as far back in the shoe as possible. The heel portion should not ride forward so as not to irritate your heel.   Orthotics are designed to work with socks. Excessive perspiration will shorten the life span of the orthotics. Remove the orthotic from the shoe frequently for proper drying.   The break-in period lasts for weeks. People new to orthotics will likely experience new aches and pains. The orthotic is forcing your foot into a new position. Arch, foot and leg muscle aches and fatigue are common during these weeks. Minor discomfort can be considered normal break in phenomenon. Start wearing your orthotic around your home your first day. Limited activity for one to two hours is recommended. You can increase one or two additional hours each day provided the aches and pains are subsiding. The degree of discomfort, fatigue and problems will dictate the speed of break  in. You may require multiple weeks to work up to full time use.   Do not continue wearing your orthotics if they are creating problems such as blisters or sores. Do not hesitate to call the clinic to speak with a nurse regarding orthotic   break in, fit, trimming, etc. You may also need to see the doctor if the orthotics are   simply not working out. Adjustments are sometimes made to improve orthotic   function.     Orthotics will only work in certain styles and types of shoes. Orthotics rarely work in dress shoes. Slip-ons, clogs, sandals and heels are particularly troublesome. Specially designed orthotics may be necessary for these types of shoes. Your custom orthotic was designed for activities that require appropriate walking or running shoes. Lace up athletic shoes, walking shoes or work boots should work appropriately. You may need a wider or longer shoe. Shoes with a removable  or insert work best. In general, you want to remove an insert from the shoe before placing the orthotic into the shoe. Shoes without a removable liner may not work as well.     When purchasing new shoes, bring your orthotics along to get a proper fit. Shop at stores that are familiar with orthotics.   Frequent washing of the orthotic may shorten the life span of the top cover. The top cover can be replaced but will generally last one to five years depending on use and foot perspiration.

## 2023-04-20 NOTE — PROGRESS NOTES
FOOT AND ANKLE SURGERY/PODIATRY CONSULT NOTE         ASSESSMENT:   Plantar calcaneal spur left foot  Gouty arthropathy left foot  Pronation deformity    I informed the patient that since he has markedly improved no further treatment is recommended for his gouty arthropathy.  I do recommend an orthotic for his heel pain.  I informed the patient that he can have a recurrence of acute gout.  If it occurs I recommend the patient return to the clinic for follow-up visit.  I told the patient that it is typically treated with NSAIDs.        HPI: I was asked to see Ray RIVERA Romie today to evaluate and treat left foot pain.  The patient indicated that he had severe foot and left ankle pain for approximately 1 month.  He had a sudden onset of pain with some discoloration and swelling.  He had pain even while resting.  He stated that if he elevated his foot the pain did improve.  He denies any trauma to the left foot.  He stated that he had a similar problem with the right foot but now it is affecting his left foot.  He had pain in the big toe joint of the left foot as well.  He had pain in the medial aspect of the left ankle.  He had a difficult time with weightbearing and ambulation.  He denies any trauma to his foot or ankle.  He had an x-ray of his left foot taken.  At that time he he was informed that he did have a heel spur.  The patient was seen in consultation at the request of Kasia Sorenson MD for left foot pain.     Past Medical History:   Diagnosis Date     CKD (chronic kidney disease)      Hypertension      Obesity        Social History     Socioeconomic History     Marital status: Patient Declined     Spouse name: Not on file     Number of children: Not on file     Years of education: Not on file     Highest education level: Not on file   Occupational History     Not on file   Tobacco Use     Smoking status: Never     Smokeless tobacco: Never   Vaping Use     Vaping status: Not on file   Substance and Sexual  Activity     Alcohol use: Yes     Comment: Alcoholic Drinks/day: occasional     Drug use: No     Sexual activity: Not on file   Other Topics Concern     Not on file   Social History Narrative     Not on file     Social Determinants of Health     Financial Resource Strain: Not on file   Food Insecurity: Not on file   Transportation Needs: Not on file   Physical Activity: Not on file   Stress: Not on file   Social Connections: Not on file   Intimate Partner Violence: Not on file   Housing Stability: Not on file        No Known Allergies       Current Outpatient Medications:      calcium carb-mag oxide-zinc ox 334-134-5 mg Tab, [CALCIUM CARB-MAG OXIDE-ZINC -134-5 MG TAB] Take 1 tablet by mouth daily., Disp: , Rfl:      chlorthalidone (HYGROTON) 25 MG tablet, TAKE 1 TABLET(25 MG) BY MOUTH DAILY, Disp: 90 tablet, Rfl: 3     clobetasol (TEMOVATE) 0.05 % external ointment, [CLOBETASOL (TEMOVATE) 0.05 % OINTMENT] Apply to affected area twice daily as needed, Disp: 30 g, Rfl: 1     lisinopril (ZESTRIL) 5 MG tablet, TAKE 1 TABLET(5 MG) BY MOUTH DAILY (Patient not taking: Reported on 4/1/2023), Disp: 90 tablet, Rfl: 3     metoprolol tartrate (LOPRESSOR) 25 MG tablet, TAKE 1 TABLET BY MOUTH TWICE DAILY, Disp: 180 tablet, Rfl: 3     naproxen (NAPROSYN) 500 MG tablet, Take 1 tablet (500 mg) by mouth 2 times daily (with meals), Disp: 30 tablet, Rfl: 1     NON FORMULARY, [NON FORMULARY] Daily Beet supplement, Disp: , Rfl:      Family History   Problem Relation Age of Onset     Cerebrovascular Disease Father      Hypertension Father      Diabetes Paternal Uncle      Hypertension Paternal Uncle         Social History     Socioeconomic History     Marital status: Patient Declined     Spouse name: Not on file     Number of children: Not on file     Years of education: Not on file     Highest education level: Not on file   Occupational History     Not on file   Tobacco Use     Smoking status: Never     Smokeless tobacco: Never    Vaping Use     Vaping status: Not on file   Substance and Sexual Activity     Alcohol use: Yes     Comment: Alcoholic Drinks/day: occasional     Drug use: No     Sexual activity: Not on file   Other Topics Concern     Not on file   Social History Narrative     Not on file     Social Determinants of Health     Financial Resource Strain: Not on file   Food Insecurity: Not on file   Transportation Needs: Not on file   Physical Activity: Not on file   Stress: Not on file   Social Connections: Not on file   Intimate Partner Violence: Not on file   Housing Stability: Not on file        Review of Systems - Patient denies fever, chills, rash, wound, stiffness, limping, numbness, weakness, heart burn, blood in stool, chest pain with activity, calf pain when walking, shortness of breath with activity, chronic cough, easy bleeding/bruising, swelling of ankles, excessive thirst, fatigue, depression, anxiety.  Patient admits to improving left foot pain and left heel pain.      OBJECTIVE:  Appearance: alert, well appearing, and in no distress.    There were no vitals taken for this visit.     There is no height or weight on file to calculate BMI.     General appearance: Patient is alert and fully cooperative with history & exam.  No sign of distress is noted during the visit.  Psychiatric: Affect is pleasant & appropriate.  Patient appears motivated to improve health.  Respiratory: Breathing is regular & unlabored while sitting.  HEENT: Hearing is intact to spoken word.  Speech is clear.  No gross evidence of visual impairment that would impact ambulation.    Vascular: Dorsalis pedis and posterior tibial pulses are palpable. There is no pedal hair growth bilaterally.  CFT < 3 sec from anterior tibial surface to distal digits bilaterally. There is no appreciable edema noted.  Dermatologic: Turgor and texture are within normal limits. No coloration or temperature changes. No primary or secondary lesions noted.  Neurologic: All  epicritic and proprioceptive sensations are grossly intact bilaterally.  Musculoskeletal: All active and passive ankle, subtalar, midtarsal, and 1st MPJ range of motion are grossly intact without pain or crepitus, with the exception of none Manual muscle strength is within normal limits bilaterally. All dorsiflexors, plantarflexors, invertors, evertors are intact bilaterally.  No tenderness present to left foot or ankle on palpation.  No tenderness to the left foot or ankle with range of motion.  Mild pain on palpation of the plantar medial aspect of the left heel.  There is flattening of the medial longitudinal arch noted bilaterally.  Calf is soft/non-tender without warmth/induration    Imaging:       No images are attached to the encounter or orders placed in the encounter.     XR Foot Left G/E 3 Views    Result Date: 4/1/2023  EXAM: XR FOOT LEFT G/E 3 VIEWS LOCATION: [facility] DATE/TIME: 4/1/2023 10:38 AM INDICATION:  Left foot pain COMPARISON: None.     IMPRESSION: No fractures are evident. Mild degenerative changes in the first MTP joint. Small plantar and Achilles calcaneal spurs.     XR Foot Left G/E 3 Views    Result Date: 4/1/2023  EXAM: XR FOOT LEFT G/E 3 VIEWS LOCATION: [facility] DATE/TIME: 4/1/2023 10:38 AM INDICATION:  Left foot pain COMPARISON: None.     IMPRESSION: No fractures are evident. Mild degenerative changes in the first MTP joint. Small plantar and Achilles calcaneal spurs.     Jaylen Navarrete DPM  [facility] Foot & Ankle Surgery/Podiatry

## 2023-04-20 NOTE — LETTER
4/20/2023         RE: Ray Grubbs  [address]  [place] [address]        Dear Colleague,    Thank you for referring your patient, Ray Grubbs, to the [facility]. Please see a copy of my visit note below.    FOOT AND ANKLE SURGERY/PODIATRY CONSULT NOTE         ASSESSMENT:   Plantar calcaneal spur left foot  Gouty arthropathy left foot  Pronation deformity    I informed the patient that since he has markedly improved no further treatment is recommended for his gouty arthropathy.  I do recommend an orthotic for his heel pain.  I informed the patient that he can have a recurrence of acute gout.  If it occurs I recommend the patient return to the clinic for follow-up visit.  I told the patient that it is typically treated with NSAIDs.        HPI: I was asked to see Ray Grubbs today to evaluate and treat left foot pain.  The patient indicated that he had severe foot and left ankle pain for approximately 1 month.  He had a sudden onset of pain with some discoloration and swelling.  He had pain even while resting.  He stated that if he elevated his foot the pain did improve.  He denies any trauma to the left foot.  He stated that he had a similar problem with the right foot but now it is affecting his left foot.  He had pain in the big toe joint of the left foot as well.  He had pain in the medial aspect of the left ankle.  He had a difficult time with weightbearing and ambulation.  He denies any trauma to his foot or ankle.  He had an x-ray of his left foot taken.  At that time he he was informed that he did have a heel spur.  The patient was seen in consultation at the request of Kasia Sorenson MD for left foot pain.     Past Medical History:   Diagnosis Date     CKD (chronic kidney disease)      Hypertension      Obesity        Social History     Socioeconomic History     Marital status: Patient Declined     Spouse name: Not on file     Number of  children: Not on file     Years of education: Not on file     Highest education level: Not on file   Occupational History     Not on file   Tobacco Use     Smoking status: Never     Smokeless tobacco: Never   Vaping Use     Vaping status: Not on file   Substance and Sexual Activity     Alcohol use: Yes     Comment: Alcoholic Drinks/day: occasional     Drug use: No     Sexual activity: Not on file   Other Topics Concern     Not on file   Social History Narrative     Not on file     Social Determinants of Health     Financial Resource Strain: Not on file   Food Insecurity: Not on file   Transportation Needs: Not on file   Physical Activity: Not on file   Stress: Not on file   Social Connections: Not on file   Intimate Partner Violence: Not on file   Housing Stability: Not on file        No Known Allergies       Current Outpatient Medications:      calcium carb-mag oxide-zinc ox 334-134-5 mg Tab, [CALCIUM CARB-MAG OXIDE-ZINC -134-5 MG TAB] Take 1 tablet by mouth daily., Disp: , Rfl:      chlorthalidone (HYGROTON) 25 MG tablet, TAKE 1 TABLET(25 MG) BY MOUTH DAILY, Disp: 90 tablet, Rfl: 3     clobetasol (TEMOVATE) 0.05 % external ointment, [CLOBETASOL (TEMOVATE) 0.05 % OINTMENT] Apply to affected area twice daily as needed, Disp: 30 g, Rfl: 1     lisinopril (ZESTRIL) 5 MG tablet, TAKE 1 TABLET(5 MG) BY MOUTH DAILY (Patient not taking: Reported on 4/1/2023), Disp: 90 tablet, Rfl: 3     metoprolol tartrate (LOPRESSOR) 25 MG tablet, TAKE 1 TABLET BY MOUTH TWICE DAILY, Disp: 180 tablet, Rfl: 3     naproxen (NAPROSYN) 500 MG tablet, Take 1 tablet (500 mg) by mouth 2 times daily (with meals), Disp: 30 tablet, Rfl: 1     NON FORMULARY, [NON FORMULARY] Daily Beet supplement, Disp: , Rfl:      Family History   Problem Relation Age of Onset     Cerebrovascular Disease Father      Hypertension Father      Diabetes Paternal Uncle      Hypertension Paternal Uncle         Social History     Socioeconomic History     Marital  status: Patient Declined     Spouse name: Not on file     Number of children: Not on file     Years of education: Not on file     Highest education level: Not on file   Occupational History     Not on file   Tobacco Use     Smoking status: Never     Smokeless tobacco: Never   Vaping Use     Vaping status: Not on file   Substance and Sexual Activity     Alcohol use: Yes     Comment: Alcoholic Drinks/day: occasional     Drug use: No     Sexual activity: Not on file   Other Topics Concern     Not on file   Social History Narrative     Not on file     Social Determinants of Health     Financial Resource Strain: Not on file   Food Insecurity: Not on file   Transportation Needs: Not on file   Physical Activity: Not on file   Stress: Not on file   Social Connections: Not on file   Intimate Partner Violence: Not on file   Housing Stability: Not on file        Review of Systems - Patient denies fever, chills, rash, wound, stiffness, limping, numbness, weakness, heart burn, blood in stool, chest pain with activity, calf pain when walking, shortness of breath with activity, chronic cough, easy bleeding/bruising, swelling of ankles, excessive thirst, fatigue, depression, anxiety.  Patient admits to improving left foot pain and left heel pain.      OBJECTIVE:  Appearance: alert, well appearing, and in no distress.    There were no vitals taken for this visit.     There is no height or weight on file to calculate BMI.     General appearance: Patient is alert and fully cooperative with history & exam.  No sign of distress is noted during the visit.  Psychiatric: Affect is pleasant & appropriate.  Patient appears motivated to improve health.  Respiratory: Breathing is regular & unlabored while sitting.  HEENT: Hearing is intact to spoken word.  Speech is clear.  No gross evidence of visual impairment that would impact ambulation.    Vascular: Dorsalis pedis and posterior tibial pulses are palpable. There is no pedal hair growth  bilaterally.  CFT < 3 sec from anterior tibial surface to distal digits bilaterally. There is no appreciable edema noted.  Dermatologic: Turgor and texture are within normal limits. No coloration or temperature changes. No primary or secondary lesions noted.  Neurologic: All epicritic and proprioceptive sensations are grossly intact bilaterally.  Musculoskeletal: All active and passive ankle, subtalar, midtarsal, and 1st MPJ range of motion are grossly intact without pain or crepitus, with the exception of none Manual muscle strength is within normal limits bilaterally. All dorsiflexors, plantarflexors, invertors, evertors are intact bilaterally.  No tenderness present to left foot or ankle on palpation.  No tenderness to the left foot or ankle with range of motion.  Mild pain on palpation of the plantar medial aspect of the left heel.  There is flattening of the medial longitudinal arch noted bilaterally.  Calf is soft/non-tender without warmth/induration    Imaging:       No images are attached to the encounter or orders placed in the encounter.     XR Foot Left G/E 3 Views    Result Date: 4/1/2023  EXAM: XR FOOT LEFT G/E 3 VIEWS LOCATION: [facility] DATE/TIME: 4/1/2023 10:38 AM INDICATION:  Left foot pain COMPARISON: None.     IMPRESSION: No fractures are evident. Mild degenerative changes in the first MTP joint. Small plantar and Achilles calcaneal spurs.     XR Foot Left G/E 3 Views    Result Date: 4/1/2023  EXAM: XR FOOT LEFT G/E 3 VIEWS LOCATION: [facility] DATE/TIME: 4/1/2023 10:38 AM INDICATION:  Left foot pain COMPARISON: None.     IMPRESSION: No fractures are evident. Mild degenerative changes in the first MTP joint. Small plantar and Achilles calcaneal spurs.     Jaylen Valles; BRIAN  [facility] Foot & Ankle Surgery/Podiatry         Again, thank you for allowing me to participate in the care of your patient.        Sincerely,        Jaylen Valles,  BRIAN

## 2023-04-26 ENCOUNTER — TELEPHONE (OUTPATIENT)
Dept: FAMILY MEDICINE | Facility: CLINIC | Age: 43
End: 2023-04-26
Payer: COMMERCIAL

## 2023-04-26 RX ORDER — ATORVASTATIN CALCIUM 20 MG/1
20 TABLET, FILM COATED ORAL DAILY
Qty: 90 TABLET | Refills: 0 | Status: SHIPPED | OUTPATIENT
Start: 2023-04-26 | End: 2023-07-25

## 2023-04-26 NOTE — TELEPHONE ENCOUNTER
----- Message from Kasia Sorenson MD sent at 4/26/2023 10:12 AM CDT -----  Please inform patient of lab results: Stable chronic kidney disease. Cholesterol is high. A medication (lipitor) is recommended to lower cholesterol and decrease risk of heart attack and stroke. He can start it now, or he can talk to his PCP on 5/3 about it if he prefers. If he starts medication, he will need repeat labs in about 2 months. I have sent rx and ordered labs.

## 2023-04-26 NOTE — TELEPHONE ENCOUNTER
Call patient in attempt to relay provider test result message and recommendation below.  However, no answer. Message left on  with clinic number provided.    MARY Tellez, RN  [facility]      Kasia Sorenson MD  [facility] Family Medicine/Ob Support Pool  Please inform patient of lab results: Stable chronic kidney disease. Cholesterol is high. A medication (lipitor) is recommended to lower cholesterol and decrease risk of heart attack and stroke. He can start it now, or he can talk to his PCP on 5/3 about it if he prefers. If he starts medication, he will need repeat labs in about 2 months. I have sent rx and ordered labs.

## 2023-04-27 NOTE — TELEPHONE ENCOUNTER
Called pt and relayed lab results. Pt verbalized understanding.    Nabeel Lin, BSN RN  [facility]

## 2023-05-03 ENCOUNTER — OFFICE VISIT (OUTPATIENT)
Dept: FAMILY MEDICINE | Facility: CLINIC | Age: 43
End: 2023-05-03
Payer: COMMERCIAL

## 2023-05-03 VITALS
TEMPERATURE: 98.2 F | SYSTOLIC BLOOD PRESSURE: 140 MMHG | HEIGHT: 69 IN | WEIGHT: 303 LBS | HEART RATE: 59 BPM | DIASTOLIC BLOOD PRESSURE: 90 MMHG | RESPIRATION RATE: 16 BRPM | BODY MASS INDEX: 44.88 KG/M2 | OXYGEN SATURATION: 99 %

## 2023-05-03 DIAGNOSIS — Z00.00 ROUTINE HISTORY AND PHYSICAL EXAMINATION OF ADULT: ICD-10-CM

## 2023-05-03 DIAGNOSIS — E78.5 HYPERLIPIDEMIA LDL GOAL <130: ICD-10-CM

## 2023-05-03 DIAGNOSIS — Z12.11 SCREEN FOR COLON CANCER: Primary | ICD-10-CM

## 2023-05-03 DIAGNOSIS — M79.672 LEFT FOOT PAIN: ICD-10-CM

## 2023-05-03 DIAGNOSIS — I10 PRIMARY HYPERTENSION: ICD-10-CM

## 2023-05-03 DIAGNOSIS — I10 ESSENTIAL HYPERTENSION: ICD-10-CM

## 2023-05-03 DIAGNOSIS — Z76.0 ENCOUNTER FOR MEDICATION REFILL: ICD-10-CM

## 2023-05-03 PROCEDURE — 99396 PREV VISIT EST AGE 40-64: CPT | Performed by: FAMILY MEDICINE

## 2023-05-03 PROCEDURE — 99214 OFFICE O/P EST MOD 30 MIN: CPT | Mod: 25 | Performed by: FAMILY MEDICINE

## 2023-05-03 RX ORDER — LISINOPRIL 5 MG/1
5 TABLET ORAL DAILY
Qty: 90 TABLET | Refills: 3 | Status: SHIPPED | OUTPATIENT
Start: 2023-05-03 | End: 2024-09-16 | Stop reason: SINTOL

## 2023-05-03 ASSESSMENT — ENCOUNTER SYMPTOMS
WEAKNESS: 0
PARESTHESIAS: 0
HEMATOCHEZIA: 0
COUGH: 0
CONSTIPATION: 0
DIZZINESS: 0
FEVER: 0
DYSURIA: 0
ABDOMINAL PAIN: 0
HEMATURIA: 0
JOINT SWELLING: 0
FREQUENCY: 0
DIARRHEA: 0
PALPITATIONS: 0
ARTHRALGIAS: 0
HEARTBURN: 0
CHILLS: 0
NAUSEA: 0
SHORTNESS OF BREATH: 0
SORE THROAT: 0
EYE PAIN: 0
HEADACHES: 1
NERVOUS/ANXIOUS: 0

## 2023-05-03 NOTE — PATIENT INSTRUCTIONS
It was a pleasure to see you again in clinic      Please note that I have made an appointment for the eye clinic they will call you to schedule your eye exam      I have also ordered a Cologuard test which is a test for colon screening and will arrive at your home      After reviewing your lab tests from April would be important for us to help with your blood pressure so I am restarting your lisinopril which is a blood pressure medication started last year you should take this tablet and continue with the chlorthalidone and metoprolol which you are already taking    Please continue to take your cholesterol medication

## 2023-05-03 NOTE — PROGRESS NOTES
SUBJECTIVE:   CC: Ray is an 43 year old who presents for preventative health visit.       5/3/2023     7:13 AM   Additional Questions   Roomed by Kristen MARTINEZ   Patient has been advised of split billing requirements and indicates understanding: Yes  Healthy Habits:     Getting at least 3 servings of Calcium per day:  Yes    Bi-annual eye exam:  NO    Dental care twice a year:  NO    Sleep apnea or symptoms of sleep apnea:  None    Diet:  Regular (no restrictions)    Frequency of exercise:  None    Taking medications regularly:  Yes    Medication side effects:  Muscle aches    PHQ-2 Total Score: 0    Additional concerns today:  No      History of presenting illness    43-year-old male here for annual physical saw Dr. Sorenson 3 weeks ago had labs done at that time.  Saw Dr. Valles from podiatry and needs custom orthotics.  Reports he is taking all his medications faithfully has not started exercising as it hurts to walk when he tries to walk around half a mile.  He is not fasting today.        Today's PHQ-2 Score:       5/3/2023     7:10 AM   PHQ-2 ( 1999 [employer])   Q1: Little interest or pleasure in doing things 0   Q2: Feeling down, depressed or hopeless 0   PHQ-2 Score 0   Q1: Little interest or pleasure in doing things Not at all   Q2: Feeling down, depressed or hopeless Not at all   PHQ-2 Score 0           Social History     Tobacco Use     Smoking status: Never     Passive exposure: Never     Smokeless tobacco: Never   Vaping Use     Vaping status: Never Used   Substance Use Topics     Alcohol use: Yes     Comment: Alcoholic Drinks/day: occasional             5/3/2023     7:10 AM   Alcohol Use   Prescreen: >3 drinks/day or >7 drinks/week? No          View : No data to display.                Last PSA: No results found for: PSA    Reviewed orders with patient. Reviewed health maintenance and updated orders accordingly - Yes  Labs reviewed in EPIC    Reviewed and updated as needed this visit by clinical staff     "Allergies  Meds              Reviewed and updated as needed this visit by Provider                     Review of Systems  CONSTITUTIONAL: NEGATIVE for fever, chills, change in weight  INTEGUMENTARY/SKIN: NEGATIVE for worrisome rashes, moles or lesions  EYES: NEGATIVE for vision changes or irritation  ENT: NEGATIVE for ear, mouth and throat problems  RESP: NEGATIVE for significant cough or SOB  CV: NEGATIVE for chest pain, palpitations or peripheral edema  GI: NEGATIVE for nausea, abdominal pain, heartburn, or change in bowel habits   male: negative for dysuria, hematuria, decreased urinary stream, erectile dysfunction, urethral discharge  MUSCULOSKELETAL:joint stiffness in left foot  NEURO: NEGATIVE for weakness, dizziness or paresthesias  PSYCHIATRIC: NEGATIVE for changes in mood or affect    OBJECTIVE:   BP (!) 142/91   Pulse 59   Temp 98.2  F (36.8  C) (Oral)   Resp 16   Ht 1.748 m (5' 8.82\")   Wt 137.4 kg (303 lb)   SpO2 99%   BMI 44.98 kg/m      Physical Exam  GENERAL: healthy, alert and no distress  EYES: Eyes grossly normal to inspection, PERRL and conjunctivae and sclerae normal  HENT: ear canals and TM's normal, nose and mouth without ulcers or lesions  NECK: no adenopathy, no asymmetry, masses, or scars and thyroid normal to palpation  RESP: lungs clear to auscultation - no rales, rhonchi or wheezes  CV: regular rate and rhythm, normal S1 S2, no S3 or S4, no murmur, click or rub, no peripheral edema and peripheral pulses strong  ABDOMEN: soft, nontender, no hepatosplenomegaly, no masses and bowel sounds normal  MS: no gross musculoskeletal defects noted, no edema  SKIN: no suspicious lesions or rashes  NEURO: Normal strength and tone, mentation intact and speech normal  PSYCH: mentation appears normal, affect normal/bright    Labs reviewed in Epic    ASSESSMENT/PLAN:       ICD-10-CM    1. Screen for colon cancer  Z12.11       2. Routine history and physical examination of adult anticipatory " "guidance discussed with patient he opts for the Cologuard test today he refuses pneumococcal vaccine Z00.00 COLOGUARD([employer])     Adult Eye  Referral      3. Essential hypertension  I10       4. Hyperlipidemia LDL goal <130 cholesterol goal discussed he should continue taking his atorvastatin E78.5       5. Primary hypertension blood pressure not at goal he is taking chlorthalidone he is taking metoprolol he was prescribed lisinopril last year by Dr. Oliveira and restarting that medication I10 lisinopril (ZESTRIL) 5 MG tablet      6. Encounter for medication refill  Z76.0 lisinopril (ZESTRIL) 5 MG tablet      7. Left foot pain he needs to follow-up with podiatry to get his custom orthotics fitted M79.672                 COUNSELING:   Reviewed preventive health counseling, as reflected in patient instructions       Regular exercise       Vision screening      BMI:   Estimated body mass index is 44.98 kg/m  as calculated from the following:    Height as of this encounter: 1.748 m (5' 8.82\").    Weight as of this encounter: 137.4 kg (303 lb).         He reports that he has never smoked. He has never been exposed to tobacco smoke. He has never used smokeless tobacco.        Bright Dominguez MD  [facility]  "

## 2023-05-04 ENCOUNTER — LAB (OUTPATIENT)
Dept: FAMILY MEDICINE | Facility: CLINIC | Age: 43
End: 2023-05-04
Payer: COMMERCIAL

## 2023-05-04 DIAGNOSIS — Z00.00 ROUTINE HISTORY AND PHYSICAL EXAMINATION OF ADULT: ICD-10-CM

## 2023-07-25 DIAGNOSIS — E78.5 HYPERLIPIDEMIA LDL GOAL <130: ICD-10-CM

## 2023-07-25 RX ORDER — ATORVASTATIN CALCIUM 20 MG/1
TABLET, FILM COATED ORAL
Qty: 90 TABLET | Refills: 2 | Status: SHIPPED | OUTPATIENT
Start: 2023-07-25

## 2023-07-25 NOTE — TELEPHONE ENCOUNTER
"Last Written Prescription Date:  4/26/23  Last Fill Quantity: 90,  # refills: 0   Last office visit provider:  5/3/23     Requested Prescriptions   Pending Prescriptions Disp Refills     atorvastatin (LIPITOR) 20 MG tablet [Pharmacy Med Name: ATORVASTATIN 20MG TABLETS] 90 tablet 0     Sig: TAKE 1 TABLET(20 MG) BY MOUTH DAILY       Statins Protocol Passed - 7/25/2023 12:13 PM        Passed - LDL on file in past 12 months     Recent Labs   Lab Test 04/17/23  0936   *             Passed - No abnormal creatine kinase in past 12 months     No lab results found.             Passed - Recent (12 mo) or future (30 days) visit within the authorizing provider's specialty     Patient has had an office visit with the authorizing provider or a provider within the authorizing providers department within the previous 12 mos or has a future within next 30 days. See \"Patient Info\" tab in inbasket, or \"Choose Columns\" in Meds & Orders section of the refill encounter.              Passed - Medication is active on med list        Passed - Patient is age 18 or older             Darcie Moses RN 07/25/23 12:13 PM  "

## 2023-12-07 DIAGNOSIS — Z76.0 ENCOUNTER FOR MEDICATION REFILL: ICD-10-CM

## 2023-12-07 DIAGNOSIS — I10 ESSENTIAL HYPERTENSION: ICD-10-CM

## 2023-12-07 RX ORDER — CHLORTHALIDONE 25 MG/1
TABLET ORAL
Qty: 90 TABLET | Refills: 3 | OUTPATIENT
Start: 2023-12-07

## 2024-02-15 DIAGNOSIS — Z76.0 ENCOUNTER FOR MEDICATION REFILL: ICD-10-CM

## 2024-02-15 RX ORDER — METOPROLOL TARTRATE 25 MG/1
TABLET, FILM COATED ORAL
Qty: 180 TABLET | Refills: 3 | Status: SHIPPED | OUTPATIENT
Start: 2024-02-15

## 2024-03-01 DIAGNOSIS — Z76.0 ENCOUNTER FOR MEDICATION REFILL: ICD-10-CM

## 2024-03-01 DIAGNOSIS — I10 ESSENTIAL HYPERTENSION: ICD-10-CM

## 2024-03-01 RX ORDER — CHLORTHALIDONE 25 MG/1
TABLET ORAL
Qty: 90 TABLET | Refills: 3 | Status: SHIPPED | OUTPATIENT
Start: 2024-03-01

## 2024-04-08 ENCOUNTER — TELEPHONE (OUTPATIENT)
Dept: FAMILY MEDICINE | Facility: CLINIC | Age: 44
End: 2024-04-08

## 2024-04-08 NOTE — TELEPHONE ENCOUNTER
Patient Quality Outreach    Patient is due for the following:   Hypertension -  Hypertension follow-up visit  Physical Preventive Adult Physical    Next Steps:   Schedule a office visit for HTN  has not been seen since 5/23 need visit for med refill    Type of outreach:    Phone, left message for patient/parent to call back.    Next Steps:  Reach out within 90 days via Phone.    Max number of attempts reached: No. Will try again in 90 days if patient still on fail list.    Questions for provider review:    None           Radha Hess MA  Chart routed to Care Team.

## 2024-09-03 ENCOUNTER — TELEPHONE (OUTPATIENT)
Dept: FAMILY MEDICINE | Facility: CLINIC | Age: 44
End: 2024-09-03
Payer: COMMERCIAL

## 2024-09-03 NOTE — TELEPHONE ENCOUNTER
Reason for Call:  Appointment Request    Patient requesting this type of appt:  Preventive     Requested provider: No primary care provider on file.    Reason patient unable to be scheduled: Needs to be scheduled by clinic    When does patient want to be seen/preferred time: 3-7 days    Comments: patient called and would like an appt to est new care and a physical and a BP check; med review with any provider at [facility]     Also okay with José Rodriguez.    Please contact patient.  Thank you.    Okay to leave a detailed message?: Yes at Cell number on file:    Telephone Information:   Mobile [phone]       Call taken on 9/3/2024 at 12:25 PM by Katerine Murcia

## 2024-09-16 ENCOUNTER — ORDERS ONLY (AUTO-RELEASED) (OUTPATIENT)
Dept: FAMILY MEDICINE | Facility: CLINIC | Age: 44
End: 2024-09-16

## 2024-09-16 ENCOUNTER — OFFICE VISIT (OUTPATIENT)
Dept: FAMILY MEDICINE | Facility: CLINIC | Age: 44
End: 2024-09-16
Payer: COMMERCIAL

## 2024-09-16 VITALS
WEIGHT: 314.25 LBS | SYSTOLIC BLOOD PRESSURE: 151 MMHG | TEMPERATURE: 98.4 F | HEIGHT: 69 IN | OXYGEN SATURATION: 99 % | RESPIRATION RATE: 18 BRPM | BODY MASS INDEX: 46.54 KG/M2 | HEART RATE: 68 BPM | DIASTOLIC BLOOD PRESSURE: 93 MMHG

## 2024-09-16 DIAGNOSIS — Z78.9 TAKES DIETARY SUPPLEMENTS: ICD-10-CM

## 2024-09-16 DIAGNOSIS — Z12.11 SCREEN FOR COLON CANCER: ICD-10-CM

## 2024-09-16 DIAGNOSIS — L73.9 CHRONIC FOLLICULITIS: ICD-10-CM

## 2024-09-16 DIAGNOSIS — E78.5 HYPERLIPIDEMIA LDL GOAL <130: ICD-10-CM

## 2024-09-16 DIAGNOSIS — I10 ESSENTIAL HYPERTENSION: Primary | ICD-10-CM

## 2024-09-16 DIAGNOSIS — N18.31 CHRONIC KIDNEY DISEASE, STAGE 3A (H): ICD-10-CM

## 2024-09-16 LAB
ALBUMIN SERPL BCG-MCNC: 4.4 G/DL (ref 3.5–5.2)
ALP SERPL-CCNC: 46 U/L (ref 40–150)
ALT SERPL W P-5'-P-CCNC: 18 U/L (ref 0–70)
ANION GAP SERPL CALCULATED.3IONS-SCNC: 12 MMOL/L (ref 7–15)
AST SERPL W P-5'-P-CCNC: 28 U/L (ref 0–45)
ATRIAL RATE - MUSE: 66 BPM
BASOPHILS # BLD AUTO: 0 10E3/UL (ref 0–0.2)
BASOPHILS NFR BLD AUTO: 0 %
BILIRUB SERPL-MCNC: 0.5 MG/DL
BUN SERPL-MCNC: 23.7 MG/DL (ref 6–20)
CALCIUM SERPL-MCNC: 9.7 MG/DL (ref 8.8–10.4)
CHLORIDE SERPL-SCNC: 100 MMOL/L (ref 98–107)
CHOLEST SERPL-MCNC: 176 MG/DL
CREAT SERPL-MCNC: 1.61 MG/DL (ref 0.67–1.17)
CREAT UR-MCNC: 171 MG/DL
DIASTOLIC BLOOD PRESSURE - MUSE: NORMAL MMHG
EGFRCR SERPLBLD CKD-EPI 2021: 53 ML/MIN/1.73M2
EOSINOPHIL # BLD AUTO: 0.2 10E3/UL (ref 0–0.7)
EOSINOPHIL NFR BLD AUTO: 3 %
ERYTHROCYTE [DISTWIDTH] IN BLOOD BY AUTOMATED COUNT: 12.6 % (ref 10–15)
FASTING STATUS PATIENT QL REPORTED: YES
FASTING STATUS PATIENT QL REPORTED: YES
GLUCOSE SERPL-MCNC: 113 MG/DL (ref 70–99)
HBA1C MFR BLD: 5.3 % (ref 0–5.6)
HCO3 SERPL-SCNC: 26 MMOL/L (ref 22–29)
HCT VFR BLD AUTO: 45.5 % (ref 40–53)
HDLC SERPL-MCNC: 26 MG/DL
HGB BLD-MCNC: 14.8 G/DL (ref 13.3–17.7)
IMM GRANULOCYTES # BLD: 0 10E3/UL
IMM GRANULOCYTES NFR BLD: 0 %
INTERPRETATION ECG - MUSE: NORMAL
LDLC SERPL CALC-MCNC: 115 MG/DL
LYMPHOCYTES # BLD AUTO: 3 10E3/UL (ref 0.8–5.3)
LYMPHOCYTES NFR BLD AUTO: 42 %
MAGNESIUM SERPL-MCNC: 1.9 MG/DL (ref 1.7–2.3)
MCH RBC QN AUTO: 29 PG (ref 26.5–33)
MCHC RBC AUTO-ENTMCNC: 32.5 G/DL (ref 31.5–36.5)
MCV RBC AUTO: 89 FL (ref 78–100)
MICROALBUMIN UR-MCNC: 14.2 MG/L
MICROALBUMIN/CREAT UR: 8.3 MG/G CR (ref 0–17)
MONOCYTES # BLD AUTO: 0.6 10E3/UL (ref 0–1.3)
MONOCYTES NFR BLD AUTO: 8 %
NEUTROPHILS # BLD AUTO: 3.3 10E3/UL (ref 1.6–8.3)
NEUTROPHILS NFR BLD AUTO: 46 %
NONHDLC SERPL-MCNC: 150 MG/DL
P AXIS - MUSE: 49 DEGREES
PLATELET # BLD AUTO: 270 10E3/UL (ref 150–450)
POTASSIUM SERPL-SCNC: 3.9 MMOL/L (ref 3.4–5.3)
PR INTERVAL - MUSE: 212 MS
PROT SERPL-MCNC: 8.3 G/DL (ref 6.4–8.3)
QRS DURATION - MUSE: 98 MS
QT - MUSE: 388 MS
QTC - MUSE: 406 MS
R AXIS - MUSE: 7 DEGREES
RBC # BLD AUTO: 5.11 10E6/UL (ref 4.4–5.9)
SODIUM SERPL-SCNC: 138 MMOL/L (ref 135–145)
SYSTOLIC BLOOD PRESSURE - MUSE: NORMAL MMHG
T AXIS - MUSE: 20 DEGREES
TRIGL SERPL-MCNC: 175 MG/DL
URATE SERPL-MCNC: 9.9 MG/DL (ref 3.4–7)
VENTRICULAR RATE- MUSE: 66 BPM
VIT D+METAB SERPL-MCNC: 18 NG/ML (ref 20–50)
WBC # BLD AUTO: 7.1 10E3/UL (ref 4–11)

## 2024-09-16 PROCEDURE — 85025 COMPLETE CBC W/AUTO DIFF WBC: CPT | Performed by: FAMILY MEDICINE

## 2024-09-16 PROCEDURE — 82043 UR ALBUMIN QUANTITATIVE: CPT | Performed by: FAMILY MEDICINE

## 2024-09-16 PROCEDURE — 80061 LIPID PANEL: CPT | Performed by: FAMILY MEDICINE

## 2024-09-16 PROCEDURE — 99214 OFFICE O/P EST MOD 30 MIN: CPT | Performed by: FAMILY MEDICINE

## 2024-09-16 PROCEDURE — 82570 ASSAY OF URINE CREATININE: CPT | Performed by: FAMILY MEDICINE

## 2024-09-16 PROCEDURE — 83735 ASSAY OF MAGNESIUM: CPT | Performed by: FAMILY MEDICINE

## 2024-09-16 PROCEDURE — 93005 ELECTROCARDIOGRAM TRACING: CPT | Performed by: FAMILY MEDICINE

## 2024-09-16 PROCEDURE — 83036 HEMOGLOBIN GLYCOSYLATED A1C: CPT | Performed by: FAMILY MEDICINE

## 2024-09-16 PROCEDURE — 36415 COLL VENOUS BLD VENIPUNCTURE: CPT | Performed by: FAMILY MEDICINE

## 2024-09-16 PROCEDURE — 84550 ASSAY OF BLOOD/URIC ACID: CPT | Performed by: FAMILY MEDICINE

## 2024-09-16 PROCEDURE — 82306 VITAMIN D 25 HYDROXY: CPT | Performed by: FAMILY MEDICINE

## 2024-09-16 PROCEDURE — 80053 COMPREHEN METABOLIC PANEL: CPT | Performed by: FAMILY MEDICINE

## 2024-09-16 RX ORDER — CLOBETASOL PROPIONATE 0.5 MG/G
OINTMENT TOPICAL
Qty: 30 G | Refills: 1 | Status: SHIPPED | OUTPATIENT
Start: 2024-09-16

## 2024-09-16 RX ORDER — LOSARTAN POTASSIUM 25 MG/1
25 TABLET ORAL DAILY
Qty: 30 TABLET | Refills: 0 | Status: SHIPPED | OUTPATIENT
Start: 2024-09-16

## 2024-09-16 NOTE — PROGRESS NOTES
"  Assessment & Plan     Hyperlipidemia LDL goal <130  Self-discontinued statin, no side effects, just did not want to be on so many medications.   - Comprehensive metabolic panel (BMP + Alb, Alk Phos, ALT, AST, Total. Bili, TP)  - Lipid panel  - Hemoglobin A1c    Essential hypertension  He is willing to try adding losartan. Discussed importance of BMP recheck within a week of starting, he will have RN BP check that day as well  - Comprehensive metabolic panel (BMP + Alb, Alk Phos, ALT, AST, Total. Bili, TP)  - Lipid panel  - CBC with platelets and differential  - losartan (COZAAR) 25 MG tablet  Dispense: 30 tablet; Refill: 0  - EKG 12-lead, tracing only    Screen for colon cancer  Willing to do cologuard  - COLOGUARD(EX[employer] SCIENCES)    Chronic kidney disease, stage 3a (H)  Will further review chart and might recommend nephrology consult   - Albumin Random Urine Quantitative with Creat Ratio  - losartan (COZAAR) 25 MG tablet  Dispense: 30 tablet; Refill: 0  - Uric acid  - Magnesium    Chronic folliculitis  Requests refill - past note reviewed, topical steroid helps  - clobetasol (TEMOVATE) 0.05 % external ointment  Dispense: 30 g; Refill: 1    Possible onychomycosis, great toenails  Mild. Discussed options, he declines oral or topical rx today    Takes dietary supplements  - Vitamin D Deficiency    Due for annual eye exam - referral placed      BMI  Estimated body mass index is 46.81 kg/m  as calculated from the following:    Height as of this encounter: 1.745 m (5' 8.7\").    Weight as of this encounter: 142.5 kg (314 lb 4 oz).             Trenton Bell is a 44 year old, presenting for the following health issues:  Hypertension        9/16/2024     8:28 AM   Additional Questions   Roomed by Janis WATKINS   Accompanied by self     Patietn scheduled for BP check since he recently checked BP at home and found it to be 150s/100. Started \"juicing\", natural remedies, also taking hydrochlorothiazide and metoprolol    Ran " "out of hydrochlorothiazide after dose yesterday morning, so has not taken today      Patient self discontinued lisinopril bc \"heart started beating funny\" - fast, irregular? Took med one time only, felt fast heartbeat, and never took again. Was not seen for this. Has not happened since.     CKD - uncertain cause? Patient reports started after a medication, improved, but never returned to normal. Per chart - 2017 at time of hypertensive urgency and NSAID use    Dental issues - needs to see dentist, needs some teeth pulled, wonders if this is cause of high BP but denies     Toenail fungus? Would not wan to take pills for this discussed options - would like to try natural remedy such as vinegar soaks, will let me know if wanting topical penlac    No urinary symptoms, not blood in urine, no discolored urine    Took statin for a while and then stopped, did not want to be on so many meds    Diagnosis of gouty arthropathy from podiatry in past according to note in 2023 - no uric acid levels    Taking supplekments - beet root, Ca, mg, vit d, vit E - sometimes every other day                  Objective    BP (!) 151/93   Pulse 68   Temp 98.4  F (36.9  C) (Oral)   Resp 18   Ht 1.745 m (5' 8.7\")   Wt 142.5 kg (314 lb 4 oz)   SpO2 99%   BMI 46.81 kg/m    Body mass index is 46.81 kg/m .  Physical Exam   GENERAL: alert and no distress  EYES: Eyes grossly normal to inspection, PERRL and conjunctivae and sclerae normal  HENT: mouth without ulcers or lesions  NECK: no adenopathy, no asymmetry, masses, or scars  RESP: lungs clear to auscultation - no rales, rhonchi or wheezes  CV: regular rate and rhythm, normal S1 S2, no S3 or S4, no murmur, click or rub, no peripheral edema  MS: no gross musculoskeletal defects noted, no edema  Nails: great toenails with linear dark discoloration, patient states chronic and unchanged; mild yellow discoloration, not significantly thickened; slight ridges.             Signed Electronically by: " Kasia Sorenson MD

## 2024-09-16 NOTE — PATIENT INSTRUCTIONS
Let me know if you want to try the toenail paint for fungus    Start losartan 25mg once daily for blood pressure (in additiona to your current meds)- and return for nurse BP check and labs (very important) within one week of starting new med

## 2024-09-23 ENCOUNTER — LAB (OUTPATIENT)
Dept: LAB | Facility: CLINIC | Age: 44
End: 2024-09-23
Payer: COMMERCIAL

## 2024-09-23 DIAGNOSIS — I10 ESSENTIAL HYPERTENSION: ICD-10-CM

## 2024-09-23 LAB
ANION GAP SERPL CALCULATED.3IONS-SCNC: 13 MMOL/L (ref 7–15)
BUN SERPL-MCNC: 19.9 MG/DL (ref 6–20)
CALCIUM SERPL-MCNC: 9.4 MG/DL (ref 8.8–10.4)
CHLORIDE SERPL-SCNC: 100 MMOL/L (ref 98–107)
CREAT SERPL-MCNC: 1.47 MG/DL (ref 0.67–1.17)
EGFRCR SERPLBLD CKD-EPI 2021: 59 ML/MIN/1.73M2
GLUCOSE SERPL-MCNC: 135 MG/DL (ref 70–99)
HCO3 SERPL-SCNC: 24 MMOL/L (ref 22–29)
POTASSIUM SERPL-SCNC: 4 MMOL/L (ref 3.4–5.3)
SODIUM SERPL-SCNC: 137 MMOL/L (ref 135–145)

## 2024-09-23 PROCEDURE — 36415 COLL VENOUS BLD VENIPUNCTURE: CPT

## 2024-09-23 PROCEDURE — 80048 BASIC METABOLIC PNL TOTAL CA: CPT

## 2024-09-27 ENCOUNTER — TELEPHONE (OUTPATIENT)
Dept: FAMILY MEDICINE | Facility: CLINIC | Age: 44
End: 2024-09-27
Payer: COMMERCIAL

## 2024-09-27 NOTE — TELEPHONE ENCOUNTER
----- Message from Kasia Sorenson sent at 9/26/2024  5:14 PM CDT -----  Please call w results:   1. Kidney function is similar to previous checks. I recommend he see a kidney specialist so that we make sure we are doing everything possible to keep his kidneys healthy, since he is still young and needs his kidneys to stay strong for many years.   2. Blood sugar result is borderline but he does not have diabetes  3. Cholesterol is better than it was before. I know he wants to avoid medications, so he can hold off on starting a statin and we can discuss this in the future.   4. Uric acid level is high. This is a chemical in the blood that can cause gout attacks, and is also sometimes hard on the kidneys. The kidney doctor will decide whether medication is needed for this.   5. Vitamin D level is low. What dose is he currently taking?

## 2024-09-27 NOTE — TELEPHONE ENCOUNTER
----- Message from Kasia Sorenson sent at 9/26/2024  5:32 PM CDT -----  Please also see result note from 9/16 labs when calling with results:  Repeat kidney labs after starting losartan are stable, which is what we want to see happen. It looks like he is scheduled on MA schedule 9/30, I assume for his BP check. After that, we will let him know if the dose is working well enough.

## 2024-09-27 NOTE — TELEPHONE ENCOUNTER
Writer called patient regarding clinician's message below. Clinician's message relayed to patient.    Per patient, he does not recall the dosage for his Vitamin D supplement. Patient is not currently home, so he is unable to verify the dosage on the bottle. Patient also wanted to ask, if he could take the Vitamin D supplement with his daily Aspirin? Patient was told during the last office visit the two medication interact with each other?    Routing patient responses back to Dr. Sorenson.    KOLE HortonN, RN   [facility]

## 2024-09-27 NOTE — TELEPHONE ENCOUNTER
Please see other TE.     Duplicate.    Closing encounter.    KOLE HortonN, RN   [facility]

## 2024-09-29 NOTE — TELEPHONE ENCOUNTER
He can take aspirin and vitamin D together. He can let us know vitamin D dose when he comes to clinic tomorrow.

## 2024-09-30 ENCOUNTER — ALLIED HEALTH/NURSE VISIT (OUTPATIENT)
Dept: FAMILY MEDICINE | Facility: CLINIC | Age: 44
End: 2024-09-30
Payer: COMMERCIAL

## 2024-09-30 VITALS — DIASTOLIC BLOOD PRESSURE: 87 MMHG | HEART RATE: 70 BPM | SYSTOLIC BLOOD PRESSURE: 132 MMHG

## 2024-09-30 DIAGNOSIS — I10 ESSENTIAL HYPERTENSION: Primary | ICD-10-CM

## 2024-09-30 PROCEDURE — 99207 PR NO CHARGE NURSE ONLY: CPT

## 2024-09-30 NOTE — TELEPHONE ENCOUNTER
Patient was seen for BP check. BP was at 132/87.    He just started taking Vitamin D3 2000 unit.     Patient is requesting a referral to see a dentist.      KOLE Mccurdy CemN RN  [facility]

## 2024-09-30 NOTE — PROGRESS NOTES
I met with Ray Grubbs at the request of Dr. Sorenson to recheck his blood pressure.  Blood pressure medications on the med list were reviewed with patient.    Patient has taken all medications as per usual regimen: Yes  Patient reports tolerating them without any issues or concerns: No    There were no vitals filed for this visit.    After 5 minutes, the patient's blood pressure was <140/90, the previous encounter was reviewed, recorded blood pressure below 140/90. Patient was discharged and the note will be sent to the provider for final review.      Nabeel Lin BSN RN  [facility]

## 2024-10-02 NOTE — TELEPHONE ENCOUNTER
Writer spoke to patient regarding Dr. Sorenson's message below. Provider's message relayed to patient.    Patient verbalizes understanding, agrees with plan and has no further questions.    KOLE HortonN, RN   [facility]

## 2024-10-02 NOTE — TELEPHONE ENCOUNTER
BP looks better - continue current medications. If home BP is running >140/90, recommend he schedule an appointment. If home BP <140/90, then follow up in 4-6 months.     Continue vitamin D 2000 units daily.    For dental referral, please provide him with contact information for community dental.

## 2024-10-03 ENCOUNTER — TRANSFERRED RECORDS (OUTPATIENT)
Dept: HEALTH INFORMATION MANAGEMENT | Facility: CLINIC | Age: 44
End: 2024-10-03
Payer: COMMERCIAL

## 2024-10-08 NOTE — LETTER
October 8, 2024  Re: Ray Grubbs  YOB: 1980    Dear Colleague,    Thank you for your referral to the [facility]. We have been unable to schedule the referral after several contact attempts.      If you have any questions or concerns, please contact our office at Dept: [phone].        Sincerely,  [facility]

## 2024-10-13 DIAGNOSIS — N18.31 CHRONIC KIDNEY DISEASE, STAGE 3A (H): ICD-10-CM

## 2024-10-13 DIAGNOSIS — I10 ESSENTIAL HYPERTENSION: ICD-10-CM

## 2024-10-14 RX ORDER — LOSARTAN POTASSIUM 25 MG/1
25 TABLET ORAL DAILY
Qty: 30 TABLET | Refills: 0 | Status: SHIPPED | OUTPATIENT
Start: 2024-10-14 | End: 2024-11-13

## 2024-11-04 NOTE — PROGRESS NOTES
Attempt 1 11/4/24  Called Pt in an attempt to assist pt with scheduling an appointment with Nephrology. LVM for pt to return call.

## 2024-11-11 NOTE — PROGRESS NOTES
11/11/2024, Attempt X2. Called patient at [phone], no answer, LVM providing Nephrology contact number for patient to call to schedule appt with Nephrology. Will try calling again in 3 days.

## 2024-11-13 DIAGNOSIS — N18.31 CHRONIC KIDNEY DISEASE, STAGE 3A (H): ICD-10-CM

## 2024-11-13 DIAGNOSIS — I10 ESSENTIAL HYPERTENSION: ICD-10-CM

## 2024-11-13 RX ORDER — LOSARTAN POTASSIUM 25 MG/1
25 TABLET ORAL DAILY
Qty: 90 TABLET | Refills: 3 | Status: SHIPPED | OUTPATIENT
Start: 2024-11-13

## 2024-11-13 NOTE — PROGRESS NOTES
Attempt X3 11/13/24  Called pt in an attempt to assist with scheduling for nephrology. LVM for pt to return call.     Closing encounter

## 2025-03-08 DIAGNOSIS — I10 ESSENTIAL HYPERTENSION: ICD-10-CM

## 2025-03-08 DIAGNOSIS — Z76.0 ENCOUNTER FOR MEDICATION REFILL: ICD-10-CM

## 2025-03-09 RX ORDER — CHLORTHALIDONE 25 MG/1
TABLET ORAL
Qty: 90 TABLET | Refills: 0 | Status: SHIPPED | OUTPATIENT
Start: 2025-03-09

## 2025-03-15 DIAGNOSIS — Z76.0 ENCOUNTER FOR MEDICATION REFILL: ICD-10-CM

## 2025-03-15 DIAGNOSIS — I10 ESSENTIAL HYPERTENSION: ICD-10-CM

## 2025-03-15 RX ORDER — CHLORTHALIDONE 25 MG/1
TABLET ORAL
Qty: 90 TABLET | Refills: 0 | OUTPATIENT
Start: 2025-03-15

## 2025-05-03 DIAGNOSIS — Z76.0 ENCOUNTER FOR MEDICATION REFILL: ICD-10-CM

## 2025-05-03 NOTE — TELEPHONE ENCOUNTER
Medication Question or Refill    Contacts       Contact Date/Time Type Contact Phone/Fax    05/03/2025 12:34 PM CDT Phone (Incoming) Ray Grubbs (Self) [phone] (M)            What medication are you calling about (include dose and sig)?: metoprolol  [id number] mg    Preferred Pharmacy:  [id number] - [place], [place] - [address] AT [facility] [address]  [address] [place] [address]  Phone: [phone] Fax: [phone]      Controlled Substance Agreement on file:   CSA -- Patient Level:    CSA: None found at the patient level.       Who prescribed the medication?: PCP    Do you need a refill? Yes    When did you use the medication last? 5/3/25    Patient offered an appointment? No    Do you have any questions or concerns?  No      Okay to leave a detailed message?: Yes at Cell number on file:    Telephone Information:   Mobile [phone]

## 2025-05-05 RX ORDER — METOPROLOL TARTRATE 25 MG/1
TABLET, FILM COATED ORAL
Qty: 180 TABLET | Refills: 3 | Status: SHIPPED | OUTPATIENT
Start: 2025-05-05 | End: 2025-05-05

## 2025-05-05 RX ORDER — METOPROLOL TARTRATE 25 MG/1
TABLET, FILM COATED ORAL
Qty: 180 TABLET | Refills: 0 | Status: SHIPPED | OUTPATIENT
Start: 2025-05-05

## 2025-05-06 ENCOUNTER — TELEPHONE (OUTPATIENT)
Dept: FAMILY MEDICINE | Facility: CLINIC | Age: 45
End: 2025-05-06
Payer: COMMERCIAL

## 2025-05-06 NOTE — TELEPHONE ENCOUNTER
Status: Signed   Refilled; but can you reach out to patient to let them know that this is a ryan refill and they should establish care with new PCP?     Thanks,  Dr. Belgica Perdomo MD / [facility]             Detail message left on vm with information above provided.  Pharmacy medication sent to provided. Clinic number also left on vm.    Vaibhav Joyce RN  [facility]

## 2025-05-06 NOTE — TELEPHONE ENCOUNTER
Refilled; but can you reach out to patient to let them know that this is a ryan refill and they should establish care with new PCP?    Thanks,  Dr. Belgica Perdomo MD / [facility]

## 2025-06-14 DIAGNOSIS — Z76.0 ENCOUNTER FOR MEDICATION REFILL: ICD-10-CM

## 2025-06-14 DIAGNOSIS — I10 ESSENTIAL HYPERTENSION: ICD-10-CM

## 2025-06-15 RX ORDER — CHLORTHALIDONE 25 MG/1
25 TABLET ORAL DAILY
Qty: 90 TABLET | Refills: 0 | Status: SHIPPED | OUTPATIENT
Start: 2025-06-15

## 2025-06-16 NOTE — TELEPHONE ENCOUNTER
Rx sent but needs appt for follow up HTN asap. I also see he did not complete his colon cancer screening test ordered last fall - is he willing to do the home stool test for that?      RN placed a call to patient with no answer.  Detailed message left on vm medication requested has been sent to pharmacy. Appt is required prior to next refill.  Clinic number provided.    Vaibhav Joyce RN   [facility]

## 2025-08-02 DIAGNOSIS — Z76.0 ENCOUNTER FOR MEDICATION REFILL: ICD-10-CM

## 2025-08-04 RX ORDER — METOPROLOL TARTRATE 25 MG/1
25 TABLET, FILM COATED ORAL
Qty: 180 TABLET | Refills: 0 | Status: SHIPPED | OUTPATIENT
Start: 2025-08-04